# Patient Record
Sex: FEMALE | Race: WHITE | NOT HISPANIC OR LATINO | Employment: FULL TIME | URBAN - METROPOLITAN AREA
[De-identification: names, ages, dates, MRNs, and addresses within clinical notes are randomized per-mention and may not be internally consistent; named-entity substitution may affect disease eponyms.]

---

## 2021-04-09 ENCOUNTER — OFFICE VISIT (OUTPATIENT)
Dept: DERMATOLOGY | Facility: CLINIC | Age: 31
End: 2021-04-09
Payer: COMMERCIAL

## 2021-04-09 ENCOUNTER — TELEPHONE (OUTPATIENT)
Dept: DERMATOLOGY | Facility: CLINIC | Age: 31
End: 2021-04-09

## 2021-04-09 DIAGNOSIS — Z80.8 FAMILY HX OF MELANOMA: ICD-10-CM

## 2021-04-09 DIAGNOSIS — D22.9 MULTIPLE BENIGN NEVI: ICD-10-CM

## 2021-04-09 DIAGNOSIS — B07.8 OTHER VIRAL WARTS: Primary | ICD-10-CM

## 2021-04-09 DIAGNOSIS — D18.01 CHERRY ANGIOMA: ICD-10-CM

## 2021-04-09 DIAGNOSIS — Z12.83 SCREENING EXAM FOR SKIN CANCER: ICD-10-CM

## 2021-04-09 PROCEDURE — 99999 PR PBB SHADOW E&M-NEW PATIENT-LVL II: ICD-10-PCS | Mod: PBBFAC,,, | Performed by: DERMATOLOGY

## 2021-04-09 PROCEDURE — 99203 OFFICE O/P NEW LOW 30 MIN: CPT | Mod: 25,S$GLB,, | Performed by: DERMATOLOGY

## 2021-04-09 PROCEDURE — 1126F AMNT PAIN NOTED NONE PRSNT: CPT | Mod: S$GLB,,, | Performed by: DERMATOLOGY

## 2021-04-09 PROCEDURE — 17110 DESTRUCTION B9 LES UP TO 14: CPT | Mod: S$GLB,,, | Performed by: DERMATOLOGY

## 2021-04-09 PROCEDURE — 17110 PR DESTRUCTION BENIGN LESIONS UP TO 14: ICD-10-PCS | Mod: S$GLB,,, | Performed by: DERMATOLOGY

## 2021-04-09 PROCEDURE — 1126F PR PAIN SEVERITY QUANTIFIED, NO PAIN PRESENT: ICD-10-PCS | Mod: S$GLB,,, | Performed by: DERMATOLOGY

## 2021-04-09 PROCEDURE — 99999 PR PBB SHADOW E&M-NEW PATIENT-LVL II: CPT | Mod: PBBFAC,,, | Performed by: DERMATOLOGY

## 2021-04-09 PROCEDURE — 99203 PR OFFICE/OUTPT VISIT, NEW, LEVL III, 30-44 MIN: ICD-10-PCS | Mod: 25,S$GLB,, | Performed by: DERMATOLOGY

## 2021-05-14 ENCOUNTER — PATIENT OUTREACH (OUTPATIENT)
Dept: ADMINISTRATIVE | Facility: HOSPITAL | Age: 31
End: 2021-05-14

## 2021-05-25 ENCOUNTER — OFFICE VISIT (OUTPATIENT)
Dept: OBSTETRICS AND GYNECOLOGY | Facility: CLINIC | Age: 31
End: 2021-05-25
Attending: OBSTETRICS & GYNECOLOGY
Payer: COMMERCIAL

## 2021-05-25 ENCOUNTER — PATIENT MESSAGE (OUTPATIENT)
Dept: OBSTETRICS AND GYNECOLOGY | Facility: CLINIC | Age: 31
End: 2021-05-25

## 2021-05-25 VITALS
SYSTOLIC BLOOD PRESSURE: 112 MMHG | DIASTOLIC BLOOD PRESSURE: 72 MMHG | BODY MASS INDEX: 27.44 KG/M2 | HEIGHT: 62 IN | WEIGHT: 149.13 LBS

## 2021-05-25 DIAGNOSIS — N64.3 GALACTORRHEA: ICD-10-CM

## 2021-05-25 DIAGNOSIS — Z30.09 ENCOUNTER FOR COUNSELING REGARDING INITIATION OF OTHER CONTRACEPTIVE MEASURE: ICD-10-CM

## 2021-05-25 DIAGNOSIS — Z12.4 PAP SMEAR FOR CERVICAL CANCER SCREENING: Primary | ICD-10-CM

## 2021-05-25 PROCEDURE — 3008F BODY MASS INDEX DOCD: CPT | Mod: CPTII,S$GLB,, | Performed by: OBSTETRICS & GYNECOLOGY

## 2021-05-25 PROCEDURE — 1126F AMNT PAIN NOTED NONE PRSNT: CPT | Mod: S$GLB,,, | Performed by: OBSTETRICS & GYNECOLOGY

## 2021-05-25 PROCEDURE — 3008F PR BODY MASS INDEX (BMI) DOCUMENTED: ICD-10-PCS | Mod: CPTII,S$GLB,, | Performed by: OBSTETRICS & GYNECOLOGY

## 2021-05-25 PROCEDURE — 99999 PR PBB SHADOW E&M-EST. PATIENT-LVL III: CPT | Mod: PBBFAC,,, | Performed by: OBSTETRICS & GYNECOLOGY

## 2021-05-25 PROCEDURE — 99385 PREV VISIT NEW AGE 18-39: CPT | Mod: S$GLB,,, | Performed by: OBSTETRICS & GYNECOLOGY

## 2021-05-25 PROCEDURE — 1126F PR PAIN SEVERITY QUANTIFIED, NO PAIN PRESENT: ICD-10-PCS | Mod: S$GLB,,, | Performed by: OBSTETRICS & GYNECOLOGY

## 2021-05-25 PROCEDURE — 88175 CYTOPATH C/V AUTO FLUID REDO: CPT | Performed by: OBSTETRICS & GYNECOLOGY

## 2021-05-25 PROCEDURE — 99999 PR PBB SHADOW E&M-EST. PATIENT-LVL III: ICD-10-PCS | Mod: PBBFAC,,, | Performed by: OBSTETRICS & GYNECOLOGY

## 2021-05-25 PROCEDURE — 99385 PR PREVENTIVE VISIT,NEW,18-39: ICD-10-PCS | Mod: S$GLB,,, | Performed by: OBSTETRICS & GYNECOLOGY

## 2021-05-25 RX ORDER — DIAZEPAM 2 MG/1
2 TABLET ORAL
Qty: 1 TABLET | Refills: 0 | Status: SHIPPED | OUTPATIENT
Start: 2021-05-25 | End: 2023-02-14

## 2021-05-28 ENCOUNTER — LAB VISIT (OUTPATIENT)
Dept: LAB | Facility: OTHER | Age: 31
End: 2021-05-28
Attending: STUDENT IN AN ORGANIZED HEALTH CARE EDUCATION/TRAINING PROGRAM
Payer: COMMERCIAL

## 2021-05-28 ENCOUNTER — OFFICE VISIT (OUTPATIENT)
Dept: INTERNAL MEDICINE | Facility: CLINIC | Age: 31
End: 2021-05-28
Payer: COMMERCIAL

## 2021-05-28 VITALS
HEART RATE: 90 BPM | OXYGEN SATURATION: 99 % | HEIGHT: 62 IN | DIASTOLIC BLOOD PRESSURE: 67 MMHG | SYSTOLIC BLOOD PRESSURE: 119 MMHG | WEIGHT: 150.38 LBS | BODY MASS INDEX: 27.67 KG/M2

## 2021-05-28 DIAGNOSIS — Z11.4 SCREENING FOR HIV (HUMAN IMMUNODEFICIENCY VIRUS): ICD-10-CM

## 2021-05-28 DIAGNOSIS — N64.3 GALACTORRHEA: ICD-10-CM

## 2021-05-28 DIAGNOSIS — Z11.59 NEED FOR HEPATITIS C SCREENING TEST: ICD-10-CM

## 2021-05-28 DIAGNOSIS — Z00.00 PREVENTATIVE HEALTH CARE: ICD-10-CM

## 2021-05-28 DIAGNOSIS — Z13.220 ENCOUNTER FOR LIPID SCREENING FOR CARDIOVASCULAR DISEASE: ICD-10-CM

## 2021-05-28 DIAGNOSIS — Z00.00 PREVENTATIVE HEALTH CARE: Primary | ICD-10-CM

## 2021-05-28 DIAGNOSIS — Z13.6 ENCOUNTER FOR LIPID SCREENING FOR CARDIOVASCULAR DISEASE: ICD-10-CM

## 2021-05-28 DIAGNOSIS — M94.0 COSTOCHONDRITIS: ICD-10-CM

## 2021-05-28 LAB
ALBUMIN SERPL BCP-MCNC: 4.4 G/DL (ref 3.5–5.2)
ALP SERPL-CCNC: 66 U/L (ref 55–135)
ALT SERPL W/O P-5'-P-CCNC: 17 U/L (ref 10–44)
ANION GAP SERPL CALC-SCNC: 10 MMOL/L (ref 8–16)
AST SERPL-CCNC: 16 U/L (ref 10–40)
BILIRUB SERPL-MCNC: 1 MG/DL (ref 0.1–1)
BUN SERPL-MCNC: 6 MG/DL (ref 6–20)
CALCIUM SERPL-MCNC: 9.9 MG/DL (ref 8.7–10.5)
CHLORIDE SERPL-SCNC: 108 MMOL/L (ref 95–110)
CHOLEST SERPL-MCNC: 166 MG/DL (ref 120–199)
CHOLEST/HDLC SERPL: 2.2 {RATIO} (ref 2–5)
CO2 SERPL-SCNC: 22 MMOL/L (ref 23–29)
CREAT SERPL-MCNC: 0.8 MG/DL (ref 0.5–1.4)
EST. GFR  (AFRICAN AMERICAN): >60 ML/MIN/1.73 M^2
EST. GFR  (NON AFRICAN AMERICAN): >60 ML/MIN/1.73 M^2
GLUCOSE SERPL-MCNC: 80 MG/DL (ref 70–110)
HDLC SERPL-MCNC: 75 MG/DL (ref 40–75)
HDLC SERPL: 45.2 % (ref 20–50)
LDLC SERPL CALC-MCNC: 82 MG/DL (ref 63–159)
NONHDLC SERPL-MCNC: 91 MG/DL
POTASSIUM SERPL-SCNC: 4.3 MMOL/L (ref 3.5–5.1)
PROLACTIN SERPL IA-MCNC: 8.3 NG/ML (ref 5.2–26.5)
PROT SERPL-MCNC: 7.5 G/DL (ref 6–8.4)
SODIUM SERPL-SCNC: 140 MMOL/L (ref 136–145)
TRIGL SERPL-MCNC: 45 MG/DL (ref 30–150)

## 2021-05-28 PROCEDURE — 86803 HEPATITIS C AB TEST: CPT | Performed by: STUDENT IN AN ORGANIZED HEALTH CARE EDUCATION/TRAINING PROGRAM

## 2021-05-28 PROCEDURE — 3008F PR BODY MASS INDEX (BMI) DOCUMENTED: ICD-10-PCS | Mod: CPTII,S$GLB,, | Performed by: STUDENT IN AN ORGANIZED HEALTH CARE EDUCATION/TRAINING PROGRAM

## 2021-05-28 PROCEDURE — 99385 PR PREVENTIVE VISIT,NEW,18-39: ICD-10-PCS | Mod: S$GLB,,, | Performed by: STUDENT IN AN ORGANIZED HEALTH CARE EDUCATION/TRAINING PROGRAM

## 2021-05-28 PROCEDURE — 99385 PREV VISIT NEW AGE 18-39: CPT | Mod: S$GLB,,, | Performed by: STUDENT IN AN ORGANIZED HEALTH CARE EDUCATION/TRAINING PROGRAM

## 2021-05-28 PROCEDURE — 3008F BODY MASS INDEX DOCD: CPT | Mod: CPTII,S$GLB,, | Performed by: STUDENT IN AN ORGANIZED HEALTH CARE EDUCATION/TRAINING PROGRAM

## 2021-05-28 PROCEDURE — 36415 COLL VENOUS BLD VENIPUNCTURE: CPT | Performed by: STUDENT IN AN ORGANIZED HEALTH CARE EDUCATION/TRAINING PROGRAM

## 2021-05-28 PROCEDURE — 99999 PR PBB SHADOW E&M-EST. PATIENT-LVL III: ICD-10-PCS | Mod: PBBFAC,,, | Performed by: STUDENT IN AN ORGANIZED HEALTH CARE EDUCATION/TRAINING PROGRAM

## 2021-05-28 PROCEDURE — 99999 PR PBB SHADOW E&M-EST. PATIENT-LVL III: CPT | Mod: PBBFAC,,, | Performed by: STUDENT IN AN ORGANIZED HEALTH CARE EDUCATION/TRAINING PROGRAM

## 2021-05-28 PROCEDURE — 1126F PR PAIN SEVERITY QUANTIFIED, NO PAIN PRESENT: ICD-10-PCS | Mod: S$GLB,,, | Performed by: STUDENT IN AN ORGANIZED HEALTH CARE EDUCATION/TRAINING PROGRAM

## 2021-05-28 PROCEDURE — 80053 COMPREHEN METABOLIC PANEL: CPT | Performed by: STUDENT IN AN ORGANIZED HEALTH CARE EDUCATION/TRAINING PROGRAM

## 2021-05-28 PROCEDURE — 80061 LIPID PANEL: CPT | Performed by: STUDENT IN AN ORGANIZED HEALTH CARE EDUCATION/TRAINING PROGRAM

## 2021-05-28 PROCEDURE — 1126F AMNT PAIN NOTED NONE PRSNT: CPT | Mod: S$GLB,,, | Performed by: STUDENT IN AN ORGANIZED HEALTH CARE EDUCATION/TRAINING PROGRAM

## 2021-05-28 PROCEDURE — 84146 ASSAY OF PROLACTIN: CPT | Performed by: STUDENT IN AN ORGANIZED HEALTH CARE EDUCATION/TRAINING PROGRAM

## 2021-05-28 PROCEDURE — 86703 HIV-1/HIV-2 1 RESULT ANTBDY: CPT | Performed by: STUDENT IN AN ORGANIZED HEALTH CARE EDUCATION/TRAINING PROGRAM

## 2021-05-31 LAB
FINAL PATHOLOGIC DIAGNOSIS: NORMAL
HCV AB SERPL QL IA: NEGATIVE
HIV 1+2 AB+HIV1 P24 AG SERPL QL IA: NEGATIVE
Lab: NORMAL

## 2021-06-03 ENCOUNTER — PATIENT MESSAGE (OUTPATIENT)
Dept: OBSTETRICS AND GYNECOLOGY | Facility: CLINIC | Age: 31
End: 2021-06-03

## 2021-07-06 ENCOUNTER — TELEPHONE (OUTPATIENT)
Dept: OBSTETRICS AND GYNECOLOGY | Facility: CLINIC | Age: 31
End: 2021-07-06

## 2021-08-09 ENCOUNTER — PATIENT MESSAGE (OUTPATIENT)
Dept: OBSTETRICS AND GYNECOLOGY | Facility: CLINIC | Age: 31
End: 2021-08-09

## 2021-08-13 ENCOUNTER — PROCEDURE VISIT (OUTPATIENT)
Dept: OBSTETRICS AND GYNECOLOGY | Facility: CLINIC | Age: 31
End: 2021-08-13
Payer: COMMERCIAL

## 2021-08-13 VITALS
SYSTOLIC BLOOD PRESSURE: 126 MMHG | HEIGHT: 62 IN | DIASTOLIC BLOOD PRESSURE: 84 MMHG | BODY MASS INDEX: 27.99 KG/M2 | WEIGHT: 152.13 LBS

## 2021-08-13 DIAGNOSIS — Z30.433 ENCOUNTER FOR IUD REMOVAL AND REINSERTION: Primary | ICD-10-CM

## 2021-08-13 PROCEDURE — 58300 INSERT INTRAUTERINE DEVICE: CPT | Mod: S$GLB,,, | Performed by: OBSTETRICS & GYNECOLOGY

## 2021-08-13 PROCEDURE — 58300 INSERTION OF IUD: ICD-10-PCS | Mod: S$GLB,,, | Performed by: OBSTETRICS & GYNECOLOGY

## 2021-08-13 PROCEDURE — 58301 REMOVE INTRAUTERINE DEVICE: CPT | Mod: S$GLB,,, | Performed by: OBSTETRICS & GYNECOLOGY

## 2021-08-13 PROCEDURE — 58301 PR REMOVE, INTRAUTERINE DEVICE: ICD-10-PCS | Mod: S$GLB,,, | Performed by: OBSTETRICS & GYNECOLOGY

## 2021-10-26 ENCOUNTER — OFFICE VISIT (OUTPATIENT)
Dept: OBSTETRICS AND GYNECOLOGY | Facility: CLINIC | Age: 31
End: 2021-10-26
Payer: COMMERCIAL

## 2021-10-26 VITALS
HEIGHT: 62 IN | DIASTOLIC BLOOD PRESSURE: 70 MMHG | WEIGHT: 153.88 LBS | SYSTOLIC BLOOD PRESSURE: 124 MMHG | BODY MASS INDEX: 28.32 KG/M2

## 2021-10-26 DIAGNOSIS — Z30.431 IUD CHECK UP: Primary | ICD-10-CM

## 2021-10-26 PROCEDURE — 3078F DIAST BP <80 MM HG: CPT | Mod: CPTII,S$GLB,, | Performed by: OBSTETRICS & GYNECOLOGY

## 2021-10-26 PROCEDURE — 3074F SYST BP LT 130 MM HG: CPT | Mod: CPTII,S$GLB,, | Performed by: OBSTETRICS & GYNECOLOGY

## 2021-10-26 PROCEDURE — 1160F PR REVIEW ALL MEDS BY PRESCRIBER/CLIN PHARMACIST DOCUMENTED: ICD-10-PCS | Mod: CPTII,S$GLB,, | Performed by: OBSTETRICS & GYNECOLOGY

## 2021-10-26 PROCEDURE — 99213 PR OFFICE/OUTPT VISIT, EST, LEVL III, 20-29 MIN: ICD-10-PCS | Mod: S$GLB,,, | Performed by: OBSTETRICS & GYNECOLOGY

## 2021-10-26 PROCEDURE — 1159F MED LIST DOCD IN RCRD: CPT | Mod: CPTII,S$GLB,, | Performed by: OBSTETRICS & GYNECOLOGY

## 2021-10-26 PROCEDURE — 99999 PR PBB SHADOW E&M-EST. PATIENT-LVL III: CPT | Mod: PBBFAC,,, | Performed by: OBSTETRICS & GYNECOLOGY

## 2021-10-26 PROCEDURE — 99999 PR PBB SHADOW E&M-EST. PATIENT-LVL III: ICD-10-PCS | Mod: PBBFAC,,, | Performed by: OBSTETRICS & GYNECOLOGY

## 2021-10-26 PROCEDURE — 1159F PR MEDICATION LIST DOCUMENTED IN MEDICAL RECORD: ICD-10-PCS | Mod: CPTII,S$GLB,, | Performed by: OBSTETRICS & GYNECOLOGY

## 2021-10-26 PROCEDURE — 1160F RVW MEDS BY RX/DR IN RCRD: CPT | Mod: CPTII,S$GLB,, | Performed by: OBSTETRICS & GYNECOLOGY

## 2021-10-26 PROCEDURE — 3074F PR MOST RECENT SYSTOLIC BLOOD PRESSURE < 130 MM HG: ICD-10-PCS | Mod: CPTII,S$GLB,, | Performed by: OBSTETRICS & GYNECOLOGY

## 2021-10-26 PROCEDURE — 3008F BODY MASS INDEX DOCD: CPT | Mod: CPTII,S$GLB,, | Performed by: OBSTETRICS & GYNECOLOGY

## 2021-10-26 PROCEDURE — 3008F PR BODY MASS INDEX (BMI) DOCUMENTED: ICD-10-PCS | Mod: CPTII,S$GLB,, | Performed by: OBSTETRICS & GYNECOLOGY

## 2021-10-26 PROCEDURE — 99213 OFFICE O/P EST LOW 20 MIN: CPT | Mod: S$GLB,,, | Performed by: OBSTETRICS & GYNECOLOGY

## 2021-10-26 PROCEDURE — 3078F PR MOST RECENT DIASTOLIC BLOOD PRESSURE < 80 MM HG: ICD-10-PCS | Mod: CPTII,S$GLB,, | Performed by: OBSTETRICS & GYNECOLOGY

## 2021-11-16 ENCOUNTER — IMMUNIZATION (OUTPATIENT)
Dept: PRIMARY CARE CLINIC | Facility: CLINIC | Age: 31
End: 2021-11-16
Payer: COMMERCIAL

## 2021-11-16 DIAGNOSIS — Z23 NEED FOR VACCINATION: Primary | ICD-10-CM

## 2021-11-16 PROCEDURE — 0004A COVID-19, MRNA, LNP-S, PF, 30 MCG/0.3 ML DOSE VACCINE: CPT | Mod: CV19,PBBFAC | Performed by: INTERNAL MEDICINE

## 2022-01-31 ENCOUNTER — HOSPITAL ENCOUNTER (EMERGENCY)
Facility: OTHER | Age: 32
Discharge: HOME | End: 2022-02-01
Attending: EMERGENCY MEDICINE
Payer: COMMERCIAL

## 2022-01-31 DIAGNOSIS — R05.9 COUGH: ICD-10-CM

## 2022-01-31 DIAGNOSIS — R53.83 PERSISTENT FATIGUE AFTER COVID-19: ICD-10-CM

## 2022-01-31 DIAGNOSIS — H53.8 BLURRY VISION, BILATERAL: Primary | ICD-10-CM

## 2022-01-31 DIAGNOSIS — R06.00 DYSPNEA: ICD-10-CM

## 2022-01-31 DIAGNOSIS — U09.9 PERSISTENT FATIGUE AFTER COVID-19: ICD-10-CM

## 2022-01-31 PROCEDURE — 99285 EMERGENCY DEPT VISIT HI MDM: CPT | Mod: 25

## 2022-02-01 VITALS
OXYGEN SATURATION: 95 % | WEIGHT: 150 LBS | BODY MASS INDEX: 27.44 KG/M2 | SYSTOLIC BLOOD PRESSURE: 129 MMHG | TEMPERATURE: 98 F | RESPIRATION RATE: 19 BRPM | DIASTOLIC BLOOD PRESSURE: 76 MMHG | HEART RATE: 76 BPM

## 2022-02-01 LAB
ALBUMIN SERPL BCP-MCNC: 4.4 G/DL (ref 3.5–5.2)
ALP SERPL-CCNC: 87 U/L (ref 55–135)
ALT SERPL W/O P-5'-P-CCNC: 12 U/L (ref 10–44)
ANION GAP SERPL CALC-SCNC: 13 MMOL/L (ref 8–16)
AST SERPL-CCNC: 13 U/L (ref 10–40)
BASOPHILS # BLD AUTO: 0.06 K/UL (ref 0–0.2)
BASOPHILS NFR BLD: 0.4 % (ref 0–1.9)
BILIRUB SERPL-MCNC: 0.4 MG/DL (ref 0.1–1)
BUN SERPL-MCNC: 12 MG/DL (ref 6–20)
CALCIUM SERPL-MCNC: 10 MG/DL (ref 8.7–10.5)
CHLORIDE SERPL-SCNC: 107 MMOL/L (ref 95–110)
CK SERPL-CCNC: 56 U/L (ref 20–180)
CO2 SERPL-SCNC: 22 MMOL/L (ref 23–29)
CREAT SERPL-MCNC: 0.8 MG/DL (ref 0.5–1.4)
CRP SERPL-MCNC: 1.8 MG/L (ref 0–8.2)
D DIMER PPP IA.FEU-MCNC: 0.26 MG/L FEU
DIFFERENTIAL METHOD: ABNORMAL
EOSINOPHIL # BLD AUTO: 0.1 K/UL (ref 0–0.5)
EOSINOPHIL NFR BLD: 0.9 % (ref 0–8)
ERYTHROCYTE [DISTWIDTH] IN BLOOD BY AUTOMATED COUNT: 11.9 % (ref 11.5–14.5)
EST. GFR  (AFRICAN AMERICAN): >60 ML/MIN/1.73 M^2
EST. GFR  (NON AFRICAN AMERICAN): >60 ML/MIN/1.73 M^2
GLUCOSE SERPL-MCNC: 89 MG/DL (ref 70–110)
HCT VFR BLD AUTO: 42.8 % (ref 37–48.5)
HGB BLD-MCNC: 14.5 G/DL (ref 12–16)
IMM GRANULOCYTES # BLD AUTO: 0.04 K/UL (ref 0–0.04)
IMM GRANULOCYTES NFR BLD AUTO: 0.3 % (ref 0–0.5)
LYMPHOCYTES # BLD AUTO: 1.6 K/UL (ref 1–4.8)
LYMPHOCYTES NFR BLD: 11.9 % (ref 18–48)
MAGNESIUM SERPL-MCNC: 2.2 MG/DL (ref 1.6–2.6)
MCH RBC QN AUTO: 29.9 PG (ref 27–31)
MCHC RBC AUTO-ENTMCNC: 33.9 G/DL (ref 32–36)
MCV RBC AUTO: 88 FL (ref 82–98)
MONOCYTES # BLD AUTO: 0.8 K/UL (ref 0.3–1)
MONOCYTES NFR BLD: 6.2 % (ref 4–15)
NEUTROPHILS # BLD AUTO: 10.9 K/UL (ref 1.8–7.7)
NEUTROPHILS NFR BLD: 80.3 % (ref 38–73)
NRBC BLD-RTO: 0 /100 WBC
PLATELET # BLD AUTO: 341 K/UL (ref 150–450)
PMV BLD AUTO: 10.2 FL (ref 9.2–12.9)
POTASSIUM SERPL-SCNC: 3.7 MMOL/L (ref 3.5–5.1)
PROT SERPL-MCNC: 8 G/DL (ref 6–8.4)
RBC # BLD AUTO: 4.85 M/UL (ref 4–5.4)
SODIUM SERPL-SCNC: 142 MMOL/L (ref 136–145)
WBC # BLD AUTO: 13.58 K/UL (ref 3.9–12.7)

## 2022-02-01 PROCEDURE — 86140 C-REACTIVE PROTEIN: CPT | Performed by: EMERGENCY MEDICINE

## 2022-02-01 PROCEDURE — 85025 COMPLETE CBC W/AUTO DIFF WBC: CPT | Performed by: EMERGENCY MEDICINE

## 2022-02-01 PROCEDURE — 85379 FIBRIN DEGRADATION QUANT: CPT | Performed by: EMERGENCY MEDICINE

## 2022-02-01 PROCEDURE — 80053 COMPREHEN METABOLIC PANEL: CPT | Performed by: EMERGENCY MEDICINE

## 2022-02-01 PROCEDURE — 83735 ASSAY OF MAGNESIUM: CPT | Performed by: EMERGENCY MEDICINE

## 2022-02-01 PROCEDURE — 82550 ASSAY OF CK (CPK): CPT | Performed by: EMERGENCY MEDICINE

## 2022-02-01 NOTE — ED PROVIDER NOTES
"Encounter Date: 2022    SCRIBE #1 NOTE: I, Avis Yesenia, am scribing for, and in the presence of, Tammy Martines MD.       History     Chief Complaint   Patient presents with    Visual Field Change     Pt reports reports sudden change in vision with onset 9pm, reports also feeling cold at the time, reports improvement in symptoms, pins and needle feeling in bilateral hands     Bladimir Bernal is a 31 y.o. female who presents to the ED with blurred vision and multiple other complaints. Patient reports she tested positive for COVID-19 over 3 weeks ago on 2022 after an onset of symptoms on 2022; states she had a cough, "lung pain", shortness of breath, fatigue, myalgias, weakness. She reports still experiencing persistent shortness of breath, fatigue, and myalgias, but tonight, she experienced a sudden onset of an episode of blurry vision, "pins and needles and feeling cold" to her bilateral hands, and lightheadedness, that resolved after 1 hour, which prompted her to the ED. States she is currently back to her baseline since her COVID-19 infection. No other exacerbating or alleviating factors. Denies cigarette use. Denies speech difficulty, headache, gait abnormalities or trouble ambulating, or other associated symptoms. Notes FHx of stroke (aunt at age 40's-50's).     The history is provided by the patient.     Review of patient's allergies indicates:  No Known Allergies  Past Medical History:   Diagnosis Date    IUD contraception     Mirena     Past Surgical History:   Procedure Laterality Date    WISDOM TOOTH EXTRACTION       Family History   Problem Relation Age of Onset    Heart attack Paternal Grandfather     Hypertension Father     Skin cancer Father         melanoma    Hyperlipidemia Mother     Colon cancer Maternal Aunt     Stroke Maternal Aunt     Suicide Maternal Aunt     Breast cancer Neg Hx     Eclampsia Neg Hx      labor Neg Hx     Miscarriages / Stillbirths Neg Hx     " "Ovarian cancer Neg Hx      Social History     Tobacco Use    Smoking status: Never Smoker    Smokeless tobacco: Never Used   Substance Use Topics    Alcohol use: Yes     Alcohol/week: 4.0 - 5.0 standard drinks     Types: 4 - 5 Standard drinks or equivalent per week     Comment: 1-2 a week , social    Drug use: Yes     Types: Marijuana     Review of Systems   Constitutional: Positive for fatigue. Negative for chills and fever.   HENT: Negative for congestion, rhinorrhea and sore throat.    Eyes: Positive for visual disturbance (bilateral blurred vision).   Respiratory: Positive for shortness of breath. Negative for cough.    Cardiovascular: Negative for chest pain.   Gastrointestinal: Negative for abdominal pain, diarrhea, nausea and vomiting.   Genitourinary: Negative for dysuria, frequency and hematuria.   Musculoskeletal: Positive for myalgias. Negative for back pain.   Skin: Negative for rash.        + "feeling cold" to hands.   Neurological: Positive for light-headedness. Negative for dizziness, speech difficulty, weakness, numbness and headaches.        + "pins and needles" to bilateral hands.   Psychiatric/Behavioral: Negative for confusion.       Physical Exam     Initial Vitals [01/31/22 2247]   BP Pulse Resp Temp SpO2   134/78 87 18 97.9 °F (36.6 °C) 95 %      MAP       --         Physical Exam    Nursing note and vitals reviewed.  Constitutional: She appears well-developed and well-nourished. She does not have a sickly appearance. No distress.   HENT:   Head: Normocephalic and atraumatic.   Right Ear: External ear normal.   Left Ear: External ear normal.   Eyes: Conjunctivae, EOM and lids are normal. Right eye exhibits no discharge. Left eye exhibits no discharge. Right conjunctiva is not injected. Right conjunctiva has no hemorrhage. Left conjunctiva is not injected. Left conjunctiva has no hemorrhage.   Neck: Phonation normal. No stridor present.   Normal range of motion.  Cardiovascular: Normal " rate, regular rhythm and normal heart sounds. Exam reveals no friction rub.    No murmur heard.  Pulmonary/Chest: Breath sounds normal. No respiratory distress. She has no wheezes. She has no rhonchi. She has no rales.   Abdominal: Abdomen is soft. There is no abdominal tenderness.   Musculoskeletal:      Cervical back: Normal range of motion.     Neurological: She is alert and oriented to person, place, and time. She has normal strength. GCS eye subscore is 4. GCS verbal subscore is 5. GCS motor subscore is 6.   Cranial nerves, strength, sensation, speech, H2N, and F2N normal and intact.    Skin: Skin is warm.   Psychiatric: She has a normal mood and affect. Her speech is normal and behavior is normal. Judgment and thought content normal. Cognition and memory are normal.         ED Course   Procedures  Labs Reviewed   CBC W/ AUTO DIFFERENTIAL - Abnormal; Notable for the following components:       Result Value    WBC 13.58 (*)     Gran # (ANC) 10.9 (*)     Gran % 80.3 (*)     Lymph % 11.9 (*)     All other components within normal limits   COMPREHENSIVE METABOLIC PANEL - Abnormal; Notable for the following components:    CO2 22 (*)     All other components within normal limits   CK   C-REACTIVE PROTEIN   D DIMER, QUANTITATIVE   MAGNESIUM     EKG Readings: (Independently Interpreted)   Initial Reading: No STEMI.   Normal sinus rhythm with a rate of 71 bpm. No ST or T wave changes.        Imaging Results          X-Ray Chest AP Portable (Final result)  Result time 02/01/22 02:58:58    Final result by Jose Gutiérrez MD (02/01/22 02:58:58)                 Impression:      No radiographic evidence of acute intrathoracic process on this single view.      Electronically signed by: Jose Gutiérrez MD  Date:    02/01/2022  Time:    02:58             Narrative:    EXAMINATION:  XR CHEST AP PORTABLE    CLINICAL HISTORY:  Cough, unspecified    TECHNIQUE:  Single frontal view of the chest was  performed.    COMPARISON:  None    FINDINGS:  Cardiac monitoring leads overlie the chest.  Cardiac silhouette appears within normal limits.  Lungs are symmetrically expanded without evidence of confluent airspace consolidation.  No significant volume of pleural fluid or pneumothorax identified.  Osseous structures appear intact.                               CT Head Without Contrast (Final result)  Result time 02/01/22 01:19:23    Final result by Terry Laurent MD (02/01/22 01:19:23)                 Impression:      No acute intracranial abnormalities identified.      Electronically signed by: Terry Laurent MD  Date:    02/01/2022  Time:    01:19             Narrative:    EXAMINATION:  CT HEAD WITHOUT CONTRAST    CLINICAL HISTORY:  Neuro deficit, acute, stroke suspected;    TECHNIQUE:  Low dose axial images were obtained through the head.  Coronal and sagittal reformations were also performed. Contrast was not administered.    COMPARISON:  None.    FINDINGS:  The brain is normally formed and exhibits normal density throughout with no indication of acute/recent major vascular distribution cerebral infarction, intraparenchymal hemorrhage, or intra-axial space occupying lesion. The ventricular system is normal in size and configuration with no evidence of hydrocephalus. No effacement of the skull-base cisterns. No abnormal extra-axial fluid collections or blood products. Visualized paranasal sinuses and mastoid air cells are clear. The calvarium shows no significant abnormality.                                 Medications - No data to display  Medical Decision Making:   History:   Old Medical Records: I decided to obtain old medical records.  Independently Interpreted Test(s):   I have ordered and independently interpreted EKG Reading(s) - see prior notes  Clinical Tests:   Lab Tests: Ordered and Reviewed  Radiological Study: Ordered and Reviewed  Medical Tests: Ordered and Reviewed    Additional MDM:   Comments:  31-year-old healthy female presented for evaluation of sudden onset of bilateral blurry vision and worsening fatigue and dyspnea which has been present throughout the month since she was diagnosed with COVID.  Patient was well-appearing on exam with normal triage vital signs.  Physical exam was unremarkable.  Specifically her neuro exam was nonfocal.  CT of the head and lab work showed no significant abnormalities.  EKG showed no evidence of dysrhythmia or ischemic changes.  Chest x-ray was also negative for any acute process.  Patient was reassured and instructed to follow-up with both Ophthalmology and primary care for further evaluation.  Indications for seeking immediate re-evaluation the emergency department were also discussed.  Patient was discharged home in stable condition..        Scribe Attestation:   Scribe #1: I performed the above scribed service and the documentation accurately describes the services I performed. I attest to the accuracy of the note.               I, Tammy Martines  , personally performed the services described in this documentation. All medical record entries made by the scribe were at my direction and in my presence. I have reviewed the chart and agree that the record reflects my personal performance and is accurate and complete.    Clinical Impression:   Final diagnoses:  [R06.00] Dyspnea  [R05.9] Cough  [H53.8] Blurry vision, bilateral (Primary)  [R53.83, B94.8] Persistent fatigue after COVID-19          ED Disposition Condition    Discharge Stable        ED Prescriptions     None        Follow-up Information     Follow up With Specialties Details Why Contact Info Additional Information    Guanako Ascencio MD Family Medicine Schedule an appointment as soon as possible for a visit   [address]  [address]  [facility] [address]  [phone]       [facility] - Ophthalmology Ophthalmology Schedule an appointment as soon as possible for a visit   [address]  [place] [address]  [phone] Ophthalmology - [facility], 3rd Floor, [address] Please park in [place] [address] and use [place] elevators    [facility] - Emergency Dept Emergency Medicine Go to  If symptoms worsen [address]  [facility] [address]  [phone]            Tammy Martines MD  02/01/22 0501

## 2022-02-01 NOTE — ED NOTES
Pt presents to ED with c/o vision changes today, new onset. Pt sts she is post COVID  approx 1 mth. Pt sts he has been having colds sensations in bilateral upper and lower extremities. Pt sts the cold sensation was more on the rt side along with some tingling to her fingertips on the rt side. Pt sts post COVID body aches, fatigue and generalized weakness. Pt also sts she becomes winded when she is talking on the phone. Pt also sts she has been experiencing some chest discomfort when she breathes deeply 2/10 . Pt sts a sensation blood rushing to her head but denies pain or pressure in the head. Pt sts she has has intermittentent HA, nausea. Pt denies vomiting.  Pt sts new onset of tingling in her lips.

## 2022-02-02 ENCOUNTER — PES CALL (OUTPATIENT)
Dept: ADMINISTRATIVE | Facility: CLINIC | Age: 32
End: 2022-02-02
Payer: COMMERCIAL

## 2022-04-04 NOTE — PROGRESS NOTES
[facility] Consultation Note    Reason for Consult:  The primary encounter diagnosis was Abdominal pain, epigastric. A diagnosis of Non-intractable vomiting with nausea, unspecified vomiting type was also pertinent to this visit.    PCP:   Guanako Ascencio   [address] / [facility] [address]    Referring MD:  Guanako Ascencio Md  [address]  [address]  [place] [address]    Initial History of Present Illness (HPI):  This is a 31 y.o. female here for evaluation of rectal bleeding, which has actually resolved on its own  CBC was normal  Sometimes throws up in the AM, bilious. Associated with work    Abdominal pain - no  Reflux - no  Dysphagia - no   Bowel habits - normal  GI bleeding - none  NSAID usage - none        ROS:  Constitutional: No fevers, chills, No weight loss  ENT: No allergies  CV: No chest pain  Pulm: No cough, No shortness of breath  Ophtho: No vision changes  GI: see HPI  Derm: + rash  Heme: No lymphadenopathy, No bruising  MSK: No arthritis  : No dysuria, No hematuria  Endo: No hot or cold intolerance  Neuro: No syncope, No seizure  Psych: No anxiety, No depression    Medical History:  has a past medical history of IUD contraception.    Surgical History:  has a past surgical history that includes [place] tooth extraction.    Family History: family history includes Colon cancer in her maternal aunt; Heart attack in her paternal grandfather; Hyperlipidemia in her mother; Hypertension in her father; Skin cancer in her father; Stroke in her maternal aunt; Suicide in her maternal aunt..     Social History:  reports that she has never smoked. She has never used smokeless tobacco. She reports current alcohol use of about 4.0 - 5.0 standard drinks of alcohol per week. She reports current drug use. Drug: Marijuana.    Review of patient's allergies indicates:  No Known Allergies    Medication List with Changes/Refills   New Medications    AMITRIPTYLINE  "(ELAVIL) 10 MG TABLET    Take 1 tablet (10 mg total) by mouth every evening.   Current Medications    DIAZEPAM (VALIUM) 2 MG TABLET    Take 1 tablet (2 mg total) by mouth On call Procedure for Anxiety. Take after signing consents for IUD placement    LEVONORGESTREL (MIRENA IU)             Objective Findings:    Vital Signs:  /66   Pulse 80   Ht 5' 2" (1.575 m)   Wt 70.5 kg (155 lb 5 oz)   BMI 28.41 kg/m²   Body mass index is 28.41 kg/m².    Physical Exam:  General Appearance: Well appearing in no acute distress  Head:   Normocephalic, without obvious abnormality  Eyes:    No scleral icterus, EOMI  ENT: Neck supple, Lips, mucosa, and tongue normal; teeth and gums normal  Lungs: CTA bilaterally in anterior and posterior fields, no wheezes, no crackles.  Heart:  Regular rate and rhythm, S1, S2 normal, no murmurs heard  Abdomen: Soft, non tender, non distended with positive bowel sounds in all four quadrants. No hepatosplenomegaly, ascites, or mass  Extremities: 2+ pulses, no clubbing, cyanosis or edema  Skin: No rash  Neurologic: CN II-XII intact      Labs:  Lab Results   Component Value Date    WBC 13.58 (H) 02/01/2022    HGB 14.5 02/01/2022    HCT 42.8 02/01/2022     02/01/2022    CHOL 166 05/28/2021    TRIG 45 05/28/2021    HDL 75 05/28/2021    ALT 12 02/01/2022    AST 13 02/01/2022     02/01/2022    K 3.7 02/01/2022     02/01/2022    CREATININE 0.8 02/01/2022    BUN 12 02/01/2022    CO2 22 (L) 02/01/2022       No results found for: HPYLORINTERP  No results found for: HPYLORIANTIG        Imaging:    Endoscopy:    None      Assessment:  1. Abdominal pain, epigastric    2. Non-intractable vomiting with nausea, unspecified vomiting type       Discussed that the nausea may certainly be anxiety related and we discussed options with therapy versus pharmaceuticals    Recommendations:  1. Check h pylori  2. Elavil Rx - she will consider  3. Hold off on EGD for now    No follow-ups on " file.      Order summary:  Orders Placed This Encounter    H. pylori Antibody, IgG    amitriptyline (ELAVIL) 10 MG tablet         Thank you so much for allowing me to participate in the care of Bladimir Griffiths MD

## 2022-04-06 ENCOUNTER — OFFICE VISIT (OUTPATIENT)
Dept: GASTROENTEROLOGY | Facility: CLINIC | Age: 32
End: 2022-04-06
Payer: COMMERCIAL

## 2022-04-06 ENCOUNTER — LAB VISIT (OUTPATIENT)
Dept: LAB | Facility: HOSPITAL | Age: 32
End: 2022-04-06
Attending: INTERNAL MEDICINE
Payer: COMMERCIAL

## 2022-04-06 VITALS
DIASTOLIC BLOOD PRESSURE: 66 MMHG | HEART RATE: 80 BPM | SYSTOLIC BLOOD PRESSURE: 105 MMHG | WEIGHT: 155.31 LBS | BODY MASS INDEX: 28.58 KG/M2 | HEIGHT: 62 IN

## 2022-04-06 DIAGNOSIS — R10.13 ABDOMINAL PAIN, EPIGASTRIC: ICD-10-CM

## 2022-04-06 DIAGNOSIS — R10.13 ABDOMINAL PAIN, EPIGASTRIC: Primary | ICD-10-CM

## 2022-04-06 DIAGNOSIS — R11.2 NON-INTRACTABLE VOMITING WITH NAUSEA, UNSPECIFIED VOMITING TYPE: ICD-10-CM

## 2022-04-06 PROCEDURE — 3008F PR BODY MASS INDEX (BMI) DOCUMENTED: ICD-10-PCS | Mod: CPTII,S$GLB,, | Performed by: INTERNAL MEDICINE

## 2022-04-06 PROCEDURE — 1159F PR MEDICATION LIST DOCUMENTED IN MEDICAL RECORD: ICD-10-PCS | Mod: CPTII,S$GLB,, | Performed by: INTERNAL MEDICINE

## 2022-04-06 PROCEDURE — 3074F PR MOST RECENT SYSTOLIC BLOOD PRESSURE < 130 MM HG: ICD-10-PCS | Mod: CPTII,S$GLB,, | Performed by: INTERNAL MEDICINE

## 2022-04-06 PROCEDURE — 1159F MED LIST DOCD IN RCRD: CPT | Mod: CPTII,S$GLB,, | Performed by: INTERNAL MEDICINE

## 2022-04-06 PROCEDURE — 86677 HELICOBACTER PYLORI ANTIBODY: CPT | Performed by: INTERNAL MEDICINE

## 2022-04-06 PROCEDURE — 3074F SYST BP LT 130 MM HG: CPT | Mod: CPTII,S$GLB,, | Performed by: INTERNAL MEDICINE

## 2022-04-06 PROCEDURE — 36415 COLL VENOUS BLD VENIPUNCTURE: CPT | Performed by: INTERNAL MEDICINE

## 2022-04-06 PROCEDURE — 3078F DIAST BP <80 MM HG: CPT | Mod: CPTII,S$GLB,, | Performed by: INTERNAL MEDICINE

## 2022-04-06 PROCEDURE — 3008F BODY MASS INDEX DOCD: CPT | Mod: CPTII,S$GLB,, | Performed by: INTERNAL MEDICINE

## 2022-04-06 PROCEDURE — 99999 PR PBB SHADOW E&M-EST. PATIENT-LVL III: ICD-10-PCS | Mod: PBBFAC,,, | Performed by: INTERNAL MEDICINE

## 2022-04-06 PROCEDURE — 99999 PR PBB SHADOW E&M-EST. PATIENT-LVL III: CPT | Mod: PBBFAC,,, | Performed by: INTERNAL MEDICINE

## 2022-04-06 PROCEDURE — 99204 PR OFFICE/OUTPT VISIT, NEW, LEVL IV, 45-59 MIN: ICD-10-PCS | Mod: S$GLB,,, | Performed by: INTERNAL MEDICINE

## 2022-04-06 PROCEDURE — 3078F PR MOST RECENT DIASTOLIC BLOOD PRESSURE < 80 MM HG: ICD-10-PCS | Mod: CPTII,S$GLB,, | Performed by: INTERNAL MEDICINE

## 2022-04-06 PROCEDURE — 99204 OFFICE O/P NEW MOD 45 MIN: CPT | Mod: S$GLB,,, | Performed by: INTERNAL MEDICINE

## 2022-04-06 RX ORDER — AMITRIPTYLINE HYDROCHLORIDE 10 MG/1
10 TABLET, FILM COATED ORAL NIGHTLY
Qty: 30 TABLET | Refills: 3 | Status: SHIPPED | OUTPATIENT
Start: 2022-04-06

## 2022-04-07 ENCOUNTER — PATIENT MESSAGE (OUTPATIENT)
Dept: GASTROENTEROLOGY | Facility: CLINIC | Age: 32
End: 2022-04-07
Payer: COMMERCIAL

## 2022-04-07 LAB — H PYLORI IGG SERPL QL IA: NEGATIVE

## 2022-04-27 ENCOUNTER — OFFICE VISIT (OUTPATIENT)
Dept: URGENT CARE | Facility: CLINIC | Age: 32
End: 2022-04-27
Payer: COMMERCIAL

## 2022-04-27 VITALS
SYSTOLIC BLOOD PRESSURE: 128 MMHG | WEIGHT: 155 LBS | HEART RATE: 86 BPM | HEIGHT: 62 IN | BODY MASS INDEX: 28.52 KG/M2 | RESPIRATION RATE: 16 BRPM | DIASTOLIC BLOOD PRESSURE: 68 MMHG | TEMPERATURE: 101 F | OXYGEN SATURATION: 99 %

## 2022-04-27 DIAGNOSIS — J10.1 INFLUENZA A: Primary | ICD-10-CM

## 2022-04-27 DIAGNOSIS — R50.9 FEVER, UNSPECIFIED FEVER CAUSE: ICD-10-CM

## 2022-04-27 DIAGNOSIS — Z11.59 ENCOUNTER FOR SCREENING FOR OTHER VIRAL DISEASES: ICD-10-CM

## 2022-04-27 LAB
CTP QC/QA: YES
CTP QC/QA: YES
POC MOLECULAR INFLUENZA A AGN: POSITIVE
POC MOLECULAR INFLUENZA B AGN: NEGATIVE
SARS-COV-2 RDRP RESP QL NAA+PROBE: NEGATIVE

## 2022-04-27 PROCEDURE — 3008F BODY MASS INDEX DOCD: CPT | Mod: CPTII,S$GLB,, | Performed by: NURSE PRACTITIONER

## 2022-04-27 PROCEDURE — 3074F SYST BP LT 130 MM HG: CPT | Mod: CPTII,S$GLB,, | Performed by: NURSE PRACTITIONER

## 2022-04-27 PROCEDURE — 1159F PR MEDICATION LIST DOCUMENTED IN MEDICAL RECORD: ICD-10-PCS | Mod: CPTII,S$GLB,, | Performed by: NURSE PRACTITIONER

## 2022-04-27 PROCEDURE — 3078F PR MOST RECENT DIASTOLIC BLOOD PRESSURE < 80 MM HG: ICD-10-PCS | Mod: CPTII,S$GLB,, | Performed by: NURSE PRACTITIONER

## 2022-04-27 PROCEDURE — 99213 OFFICE O/P EST LOW 20 MIN: CPT | Mod: S$GLB,CS,, | Performed by: NURSE PRACTITIONER

## 2022-04-27 PROCEDURE — 87502 INFLUENZA DNA AMP PROBE: CPT | Mod: QW,S$GLB,, | Performed by: NURSE PRACTITIONER

## 2022-04-27 PROCEDURE — 87502 POCT INFLUENZA A/B MOLECULAR: ICD-10-PCS | Mod: QW,S$GLB,, | Performed by: NURSE PRACTITIONER

## 2022-04-27 PROCEDURE — 1159F MED LIST DOCD IN RCRD: CPT | Mod: CPTII,S$GLB,, | Performed by: NURSE PRACTITIONER

## 2022-04-27 PROCEDURE — 3074F PR MOST RECENT SYSTOLIC BLOOD PRESSURE < 130 MM HG: ICD-10-PCS | Mod: CPTII,S$GLB,, | Performed by: NURSE PRACTITIONER

## 2022-04-27 PROCEDURE — U0002: ICD-10-PCS | Mod: QW,S$GLB,, | Performed by: NURSE PRACTITIONER

## 2022-04-27 PROCEDURE — 99213 PR OFFICE/OUTPT VISIT, EST, LEVL III, 20-29 MIN: ICD-10-PCS | Mod: S$GLB,CS,, | Performed by: NURSE PRACTITIONER

## 2022-04-27 PROCEDURE — U0002 COVID-19 LAB TEST NON-CDC: HCPCS | Mod: QW,S$GLB,, | Performed by: NURSE PRACTITIONER

## 2022-04-27 PROCEDURE — 3008F PR BODY MASS INDEX (BMI) DOCUMENTED: ICD-10-PCS | Mod: CPTII,S$GLB,, | Performed by: NURSE PRACTITIONER

## 2022-04-27 PROCEDURE — 3078F DIAST BP <80 MM HG: CPT | Mod: CPTII,S$GLB,, | Performed by: NURSE PRACTITIONER

## 2022-04-27 RX ORDER — IBUPROFEN 200 MG
600 TABLET ORAL
Status: COMPLETED | OUTPATIENT
Start: 2022-04-27 | End: 2022-04-27

## 2022-04-27 RX ORDER — OSELTAMIVIR PHOSPHATE 75 MG/1
75 CAPSULE ORAL 2 TIMES DAILY
Qty: 10 CAPSULE | Refills: 0 | Status: SHIPPED | OUTPATIENT
Start: 2022-04-27 | End: 2022-05-02

## 2022-04-27 RX ADMIN — Medication 600 MG: at 02:04

## 2022-04-27 NOTE — PATIENT INSTRUCTIONS
Take Tamiflu as directed    Use an antihistamine such as Claritin, Zyrtec or Allegra to dry you out.     Use pseudoephedrine (behind the counter) to decongest. Pseudoephedrine  30 mg up to 240 mg /day. It can raise your blood pressure and give you palpitations.    Use warm salt water gargles to ease your throat pain. Hot tea with honey.     Over the counter you can use Tylenol (acetominophen) or Ibuprofen for your minor aches and pains as long as you have no contraindications.    Good nutrition. Lots of rest. Plenty of fluids     You must understand that you've received an Urgent Care treatment only and that you may be released before all your medical problems are known or treated. You, the patient, will arrange for follow up care as instructed.  Follow up with your PCP or specialty clinic as directed in the next 1-2 weeks if not improved or as needed.  You can call [phone] to schedule an appointment with the appropriate provider.  If your condition worsens we recommend that you receive another evaluation at the emergency room immediately or contact your primary medical clinics after hours call service to discuss your concerns.  Please return here or go to the Emergency Department for any concerns or worsening of condition.

## 2022-04-27 NOTE — PROGRESS NOTES
"Subjective:       Patient ID: Bladimir Bernal is a 31 y.o. female.    Vitals:  height is 5' 2" (1.575 m) and weight is 70.3 kg (155 lb). Her tympanic temperature is 100.7 °F (38.2 °C) (abnormal). Her blood pressure is 128/68 and her pulse is 86. Her respiration is 16 and oxygen saturation is 99%.     Chief Complaint: Headache (Woke up feeling extremely ill. Bad headache. Whole body is achy & tired. Lymph nodes seem swollen. Coughing up green phlegm. Hoping for a strep test + testing for anything else this might be. Want to make sure I'm not contagious. - Entered by patient)    Patient reports headache started this morning.Patient reports she had both covid shots and Covid booster.    Headache   This is a new problem. The current episode started today. The problem occurs constantly. The pain is located in the frontal region. The pain does not radiate. The pain quality is similar to prior headaches. The quality of the pain is described as dull and aching. The pain is at a severity of 2/10. Associated symptoms include coughing (productive), a sore throat and swollen glands. Pertinent negatives include no sinus pressure. Associated symptoms comments: Fatigue  Body ache  chills. Nothing aggravates the symptoms. She has tried nothing for the symptoms. There is no history of migraine headaches.       HENT: Positive for sore throat. Negative for sinus pressure.    Respiratory: Positive for cough (productive).    Neurological: Positive for headaches. Negative for history of migraines.       Objective:      Physical Exam   Constitutional: She is oriented to person, place, and time. She appears well-developed. She is cooperative.  Non-toxic appearance. She does not appear ill. No distress.      Comments:febrile   HENT:   Head: Normocephalic and atraumatic.   Ears:   Right Ear: Hearing, tympanic membrane, external ear and ear canal normal.   Left Ear: Hearing, tympanic membrane, external ear and ear canal normal.   Nose: Nose " normal. No mucosal edema, rhinorrhea or nasal deformity. No epistaxis. Right sinus exhibits no maxillary sinus tenderness and no frontal sinus tenderness. Left sinus exhibits no maxillary sinus tenderness and no frontal sinus tenderness.   Mouth/Throat: Uvula is midline, oropharynx is clear and moist and mucous membranes are normal. No trismus in the jaw. Normal dentition. No uvula swelling. Cobblestoning present. No posterior oropharyngeal erythema.   PND      Comments: PND  Eyes: Conjunctivae and lids are normal. Right eye exhibits no discharge. Left eye exhibits no discharge. No scleral icterus.   Neck: Trachea normal and phonation normal. Neck supple.   Cardiovascular: Normal rate, regular rhythm, normal heart sounds and normal pulses.   Pulmonary/Chest: Effort normal and breath sounds normal. No respiratory distress.   Lungs clear bilaterally    Comments: Lungs clear bilaterally    Abdominal: Normal appearance and bowel sounds are normal. She exhibits no distension and no mass. Soft. There is no abdominal tenderness.   Musculoskeletal: Normal range of motion.         General: No deformity. Normal range of motion.   Neurological: She is alert and oriented to person, place, and time. She exhibits normal muscle tone. Coordination normal.   Skin: Skin is warm, dry, intact, not diaphoretic and not pale.   Psychiatric: Her speech is normal and behavior is normal. Judgment and thought content normal.   Nursing note and vitals reviewed.        Results for orders placed or performed in visit on 04/27/22   POCT COVID-19 Rapid Screening   Result Value Ref Range    POC Rapid COVID Negative Negative     Acceptable Yes    POCT Influenza A/B MOLECULAR   Result Value Ref Range    POC Molecular Influenza A Ag Positive (A) Negative, Not Reported    POC Molecular Influenza B Ag Negative Negative, Not Reported     Acceptable Yes      Assessment:       1. Influenza A    2. Encounter for screening for  other viral diseases    3. Fever, unspecified fever cause          Plan:         Influenza A  -     oseltamivir (TAMIFLU) 75 MG capsule; Take 1 capsule (75 mg total) by mouth 2 (two) times daily. for 5 days  Dispense: 10 capsule; Refill: 0    Encounter for screening for other viral diseases  -     Cancel: POCT COVID-19 Rapid Screening  -     POCT COVID-19 Rapid Screening    Fever, unspecified fever cause  -     POCT Influenza A/B MOLECULAR  -     ibuprofen tablet 600 mg      Patient Instructions   Take Tamiflu as directed    Use an antihistamine such as Claritin, Zyrtec or Allegra to dry you out.     Use pseudoephedrine (behind the counter) to decongest. Pseudoephedrine  30 mg up to 240 mg /day. It can raise your blood pressure and give you palpitations.    Use warm salt water gargles to ease your throat pain. Hot tea with honey.     Over the counter you can use Tylenol (acetominophen) or Ibuprofen for your minor aches and pains as long as you have no contraindications.    Good nutrition. Lots of rest. Plenty of fluids     You must understand that you've received an Urgent Care treatment only and that you may be released before all your medical problems are known or treated. You, the patient, will arrange for follow up care as instructed.  Follow up with your PCP or specialty clinic as directed in the next 1-2 weeks if not improved or as needed.  You can call [phone] to schedule an appointment with the appropriate provider.  If your condition worsens we recommend that you receive another evaluation at the emergency room immediately or contact your primary medical clinics after hours call service to discuss your concerns.  Please return here or go to the Emergency Department for any concerns or worsening of condition.

## 2022-04-27 NOTE — LETTER
April 27, 2022      Urgent Care [address] [address]  Phone: [phone]  Fax: [phone]       Patient: Bladimir Bernal   YOB: 1990  Date of Visit: 04/27/2022    To Whom It May Concern:    Lilian Bernal  was at [facility] on 04/27/2022. The patient may return to work/school on 5/2/22 with no restrictions. If you have any questions or concerns, or if I can be of further assistance, please do not hesitate to contact me.    Sincerely,    Erin Burch NP

## 2022-05-03 ENCOUNTER — LAB VISIT (OUTPATIENT)
Dept: LAB | Facility: OTHER | Age: 32
End: 2022-05-03
Attending: STUDENT IN AN ORGANIZED HEALTH CARE EDUCATION/TRAINING PROGRAM
Payer: COMMERCIAL

## 2022-05-03 ENCOUNTER — OFFICE VISIT (OUTPATIENT)
Dept: INTERNAL MEDICINE | Facility: CLINIC | Age: 32
End: 2022-05-03
Payer: COMMERCIAL

## 2022-05-03 VITALS
HEART RATE: 71 BPM | SYSTOLIC BLOOD PRESSURE: 94 MMHG | OXYGEN SATURATION: 99 % | WEIGHT: 151.88 LBS | DIASTOLIC BLOOD PRESSURE: 66 MMHG | BODY MASS INDEX: 27.95 KG/M2 | HEIGHT: 62 IN

## 2022-05-03 DIAGNOSIS — Z00.00 ANNUAL PHYSICAL EXAM: Primary | ICD-10-CM

## 2022-05-03 DIAGNOSIS — Z00.00 ANNUAL PHYSICAL EXAM: ICD-10-CM

## 2022-05-03 DIAGNOSIS — Z11.3 SCREENING FOR STD (SEXUALLY TRANSMITTED DISEASE): ICD-10-CM

## 2022-05-03 DIAGNOSIS — F43.9 STRESS: ICD-10-CM

## 2022-05-03 PROBLEM — M94.0 COSTOCHONDRITIS: Status: RESOLVED | Noted: 2021-05-28 | Resolved: 2022-05-03

## 2022-05-03 PROBLEM — N64.3 GALACTORRHEA: Status: RESOLVED | Noted: 2021-05-28 | Resolved: 2022-05-03

## 2022-05-03 LAB
ALBUMIN SERPL BCP-MCNC: 4.4 G/DL (ref 3.5–5.2)
ALP SERPL-CCNC: 65 U/L (ref 55–135)
ALT SERPL W/O P-5'-P-CCNC: 8 U/L (ref 10–44)
ANION GAP SERPL CALC-SCNC: 10 MMOL/L (ref 8–16)
AST SERPL-CCNC: 13 U/L (ref 10–40)
BASOPHILS # BLD AUTO: 0.04 K/UL (ref 0–0.2)
BASOPHILS NFR BLD: 0.5 % (ref 0–1.9)
BILIRUB SERPL-MCNC: 0.7 MG/DL (ref 0.1–1)
BUN SERPL-MCNC: 10 MG/DL (ref 6–20)
CALCIUM SERPL-MCNC: 10.1 MG/DL (ref 8.7–10.5)
CHLORIDE SERPL-SCNC: 107 MMOL/L (ref 95–110)
CHOLEST SERPL-MCNC: 160 MG/DL (ref 120–199)
CHOLEST/HDLC SERPL: 3.2 {RATIO} (ref 2–5)
CO2 SERPL-SCNC: 27 MMOL/L (ref 23–29)
CREAT SERPL-MCNC: 1.1 MG/DL (ref 0.5–1.4)
DIFFERENTIAL METHOD: NORMAL
EOSINOPHIL # BLD AUTO: 0.1 K/UL (ref 0–0.5)
EOSINOPHIL NFR BLD: 0.7 % (ref 0–8)
ERYTHROCYTE [DISTWIDTH] IN BLOOD BY AUTOMATED COUNT: 11.7 % (ref 11.5–14.5)
EST. GFR  (AFRICAN AMERICAN): >60 ML/MIN/1.73 M^2
EST. GFR  (NON AFRICAN AMERICAN): >60 ML/MIN/1.73 M^2
ESTIMATED AVG GLUCOSE: 97 MG/DL (ref 68–131)
GLUCOSE SERPL-MCNC: 75 MG/DL (ref 70–110)
HBA1C MFR BLD: 5 % (ref 4–5.6)
HCT VFR BLD AUTO: 43.5 % (ref 37–48.5)
HDLC SERPL-MCNC: 50 MG/DL (ref 40–75)
HDLC SERPL: 31.3 % (ref 20–50)
HGB BLD-MCNC: 14.6 G/DL (ref 12–16)
IMM GRANULOCYTES # BLD AUTO: 0.03 K/UL (ref 0–0.04)
IMM GRANULOCYTES NFR BLD AUTO: 0.4 % (ref 0–0.5)
LDLC SERPL CALC-MCNC: 95.2 MG/DL (ref 63–159)
LYMPHOCYTES # BLD AUTO: 1.8 K/UL (ref 1–4.8)
LYMPHOCYTES NFR BLD: 24.7 % (ref 18–48)
MCH RBC QN AUTO: 30.4 PG (ref 27–31)
MCHC RBC AUTO-ENTMCNC: 33.6 G/DL (ref 32–36)
MCV RBC AUTO: 90 FL (ref 82–98)
MONOCYTES # BLD AUTO: 0.6 K/UL (ref 0.3–1)
MONOCYTES NFR BLD: 7.9 % (ref 4–15)
NEUTROPHILS # BLD AUTO: 4.9 K/UL (ref 1.8–7.7)
NEUTROPHILS NFR BLD: 65.8 % (ref 38–73)
NONHDLC SERPL-MCNC: 110 MG/DL
NRBC BLD-RTO: 0 /100 WBC
PLATELET # BLD AUTO: 293 K/UL (ref 150–450)
PMV BLD AUTO: 10 FL (ref 9.2–12.9)
POTASSIUM SERPL-SCNC: 3.7 MMOL/L (ref 3.5–5.1)
PROT SERPL-MCNC: 7.3 G/DL (ref 6–8.4)
RBC # BLD AUTO: 4.81 M/UL (ref 4–5.4)
SODIUM SERPL-SCNC: 144 MMOL/L (ref 136–145)
TRIGL SERPL-MCNC: 74 MG/DL (ref 30–150)
TSH SERPL DL<=0.005 MIU/L-ACNC: 0.64 UIU/ML (ref 0.4–4)
WBC # BLD AUTO: 7.38 K/UL (ref 3.9–12.7)

## 2022-05-03 PROCEDURE — 3078F PR MOST RECENT DIASTOLIC BLOOD PRESSURE < 80 MM HG: ICD-10-PCS | Mod: CPTII,S$GLB,, | Performed by: STUDENT IN AN ORGANIZED HEALTH CARE EDUCATION/TRAINING PROGRAM

## 2022-05-03 PROCEDURE — 99395 PREV VISIT EST AGE 18-39: CPT | Mod: S$GLB,,, | Performed by: STUDENT IN AN ORGANIZED HEALTH CARE EDUCATION/TRAINING PROGRAM

## 2022-05-03 PROCEDURE — 99999 PR PBB SHADOW E&M-EST. PATIENT-LVL III: CPT | Mod: PBBFAC,,, | Performed by: STUDENT IN AN ORGANIZED HEALTH CARE EDUCATION/TRAINING PROGRAM

## 2022-05-03 PROCEDURE — 80061 LIPID PANEL: CPT | Performed by: STUDENT IN AN ORGANIZED HEALTH CARE EDUCATION/TRAINING PROGRAM

## 2022-05-03 PROCEDURE — 3074F PR MOST RECENT SYSTOLIC BLOOD PRESSURE < 130 MM HG: ICD-10-PCS | Mod: CPTII,S$GLB,, | Performed by: STUDENT IN AN ORGANIZED HEALTH CARE EDUCATION/TRAINING PROGRAM

## 2022-05-03 PROCEDURE — 3078F DIAST BP <80 MM HG: CPT | Mod: CPTII,S$GLB,, | Performed by: STUDENT IN AN ORGANIZED HEALTH CARE EDUCATION/TRAINING PROGRAM

## 2022-05-03 PROCEDURE — 84443 ASSAY THYROID STIM HORMONE: CPT | Performed by: STUDENT IN AN ORGANIZED HEALTH CARE EDUCATION/TRAINING PROGRAM

## 2022-05-03 PROCEDURE — 3074F SYST BP LT 130 MM HG: CPT | Mod: CPTII,S$GLB,, | Performed by: STUDENT IN AN ORGANIZED HEALTH CARE EDUCATION/TRAINING PROGRAM

## 2022-05-03 PROCEDURE — 85025 COMPLETE CBC W/AUTO DIFF WBC: CPT | Performed by: STUDENT IN AN ORGANIZED HEALTH CARE EDUCATION/TRAINING PROGRAM

## 2022-05-03 PROCEDURE — 1159F PR MEDICATION LIST DOCUMENTED IN MEDICAL RECORD: ICD-10-PCS | Mod: CPTII,S$GLB,, | Performed by: STUDENT IN AN ORGANIZED HEALTH CARE EDUCATION/TRAINING PROGRAM

## 2022-05-03 PROCEDURE — 3008F BODY MASS INDEX DOCD: CPT | Mod: CPTII,S$GLB,, | Performed by: STUDENT IN AN ORGANIZED HEALTH CARE EDUCATION/TRAINING PROGRAM

## 2022-05-03 PROCEDURE — 86592 SYPHILIS TEST NON-TREP QUAL: CPT | Performed by: STUDENT IN AN ORGANIZED HEALTH CARE EDUCATION/TRAINING PROGRAM

## 2022-05-03 PROCEDURE — 36415 COLL VENOUS BLD VENIPUNCTURE: CPT | Performed by: STUDENT IN AN ORGANIZED HEALTH CARE EDUCATION/TRAINING PROGRAM

## 2022-05-03 PROCEDURE — 3008F PR BODY MASS INDEX (BMI) DOCUMENTED: ICD-10-PCS | Mod: CPTII,S$GLB,, | Performed by: STUDENT IN AN ORGANIZED HEALTH CARE EDUCATION/TRAINING PROGRAM

## 2022-05-03 PROCEDURE — 87389 HIV-1 AG W/HIV-1&-2 AB AG IA: CPT | Performed by: STUDENT IN AN ORGANIZED HEALTH CARE EDUCATION/TRAINING PROGRAM

## 2022-05-03 PROCEDURE — 80053 COMPREHEN METABOLIC PANEL: CPT | Performed by: STUDENT IN AN ORGANIZED HEALTH CARE EDUCATION/TRAINING PROGRAM

## 2022-05-03 PROCEDURE — 99999 PR PBB SHADOW E&M-EST. PATIENT-LVL III: ICD-10-PCS | Mod: PBBFAC,,, | Performed by: STUDENT IN AN ORGANIZED HEALTH CARE EDUCATION/TRAINING PROGRAM

## 2022-05-03 PROCEDURE — 1159F MED LIST DOCD IN RCRD: CPT | Mod: CPTII,S$GLB,, | Performed by: STUDENT IN AN ORGANIZED HEALTH CARE EDUCATION/TRAINING PROGRAM

## 2022-05-03 PROCEDURE — 80074 ACUTE HEPATITIS PANEL: CPT | Performed by: STUDENT IN AN ORGANIZED HEALTH CARE EDUCATION/TRAINING PROGRAM

## 2022-05-03 PROCEDURE — 83036 HEMOGLOBIN GLYCOSYLATED A1C: CPT | Performed by: STUDENT IN AN ORGANIZED HEALTH CARE EDUCATION/TRAINING PROGRAM

## 2022-05-03 PROCEDURE — 99395 PR PREVENTIVE VISIT,EST,18-39: ICD-10-PCS | Mod: S$GLB,,, | Performed by: STUDENT IN AN ORGANIZED HEALTH CARE EDUCATION/TRAINING PROGRAM

## 2022-05-03 NOTE — PROGRESS NOTES
[facility]    Subjective:       Patient ID: Bladimir Bernal is a 31 y.o. female.    Chief Complaint: lingering post cough, Fatigue, and Annual Exam      History was obtained from the patient and supplemented through chart review.  This patient is known to me.     HPI:    Patient is a 31 y.o. female presents for annual    Covid test positive early jan   Couldn;t leave the house for approx 2 weeks  Fatigue  Flu this past weekend  High stress, feels suppressed immune system  Stomach flu recently  Cough resolved    GI symptoms  Aunt with colon cancer  Stress, family  Low grade nausea in mornings before court; amytriptline  Referral Dr. Crespo    Anxiety  Joined [facility]  Had done therapy in the past  Referral psychology  Dr. Crespo    Lactation  1 year  No blood  With nipple stimulation, bilat? Pt unsure.    Prolactin normal 5/28/2021    Family history of mental illness  3 family members with suicide    Medical History  Past Medical History:   Diagnosis Date    IUD contraception     Mirena       Review of Systems   Constitutional: Positive for fatigue. Negative for fever.   HENT: Negative for trouble swallowing.    Respiratory: Negative for shortness of breath.    Cardiovascular: Negative for chest pain.   Gastrointestinal: Negative for constipation, diarrhea, nausea and vomiting.   Musculoskeletal: Negative for gait problem.   Neurological: Negative for dizziness and seizures.   Psychiatric/Behavioral: Negative for hallucinations.         Surgical hx, family hx, social hx   Have been reviewed      Current Outpatient Medications:     levonorgestrel (MIRENA IU), , Disp: , Rfl:     amitriptyline (ELAVIL) 10 MG tablet, Take 1 tablet (10 mg total) by mouth every evening. (Patient not taking: No sig reported), Disp: 30 tablet, Rfl: 3    diazePAM (VALIUM) 2 MG tablet, Take 1 tablet (2 mg total) by mouth On call Procedure for Anxiety. Take after signing consents for IUD placement (Patient not  "taking: No sig reported), Disp: 1 tablet, Rfl: 0    Current Facility-Administered Medications:     levonorgestreL (MIRENA) 20 mcg/24 hours (6 yrs) 52 mg IUD 1 Intra Uterine Device, 1 Intra Uterine Device, Intrauterine, , Aaliyah Rosenberg MD, 1 Intra Uterine Device at 08/13/21 1115    Objective:        Body mass index is 27.78 kg/m².  Vitals:    05/03/22 1611   BP: 94/66   Pulse: 71   SpO2: 99%   Weight: 68.9 kg (151 lb 14.4 oz)   Height: 5' 2" (1.575 m)   PainSc: 0-No pain     Physical Exam  Vitals and nursing note reviewed.   Constitutional:       General: She is not in acute distress.     Appearance: Normal appearance. She is not ill-appearing.   HENT:      Head: Normocephalic and atraumatic.   Eyes:      General: No scleral icterus.  Cardiovascular:      Rate and Rhythm: Normal rate and regular rhythm.      Heart sounds: Normal heart sounds.   Pulmonary:      Effort: Pulmonary effort is normal.   Abdominal:      General: There is no distension.   Musculoskeletal:         General: No deformity.      Cervical back: Normal range of motion.   Skin:     General: Skin is warm and dry.   Neurological:      Mental Status: She is alert and oriented to person, place, and time.   Psychiatric:         Behavior: Behavior normal.           Lab Results   Component Value Date    WBC 13.58 (H) 02/01/2022    HGB 14.5 02/01/2022    HCT 42.8 02/01/2022     02/01/2022    CHOL 166 05/28/2021    TRIG 45 05/28/2021    HDL 75 05/28/2021    ALT 12 02/01/2022    AST 13 02/01/2022     02/01/2022    K 3.7 02/01/2022     02/01/2022    CREATININE 0.8 02/01/2022    BUN 12 02/01/2022    CO2 22 (L) 02/01/2022       The ASCVD Risk score (Canal Fultongermaine LOW Jr., et al., 2013) failed to calculate for the following reasons:    The 2013 ASCVD risk score is only valid for ages 40 to 79    (Imaging have been independently reviewed)    Assessment:         1. Annual physical exam    2. Screening for STD (sexually transmitted disease)    3. " Stress          Plan:     Bladimir was seen today for lingering post cough, fatigue and annual exam.    Diagnoses and all orders for this visit:    Annual physical exam  -     Comprehensive Metabolic Panel; Future  -     Lipid Panel; Future  -     TSH; Future  -     CBC Auto Differential; Future  -     Hemoglobin A1C; Future    Screening for STD (sexually transmitted disease)  -     RPR; Future  -     HIV 1/2 Ag/Ab (4th Gen); Future  -     C. trachomatis/N. gonorrhoeae by AMP DNA [facility]; Urine; Future  -     HEPATITIS PANEL, ACUTE; Future    Stress  -     Ambulatory referral/consult to Psychology; Future        Health Maintenance  - Lipids: normal 5/2021  - A1C: normal 5/2021  - Colon Ca Screen: na  - Immunizations: received vaccine    Women's health  - Pap:  NILM normal 5/31/2021  - Mammo: na  - Dexa: na  - Contraception: IUD  - No concern for STDs    Follow up in about 1 year (around 5/3/2023). or sooner prn      Check labs    All medications were reviewed including potential side effects and risks/benefits.  Pt was counseled to call back if anything worsens or if questions arise.    Guanako Ascencio MD  Family Medicine  [facility]  [address]  [address]  [place] [address]  Phone [phone]  Fax [phone]

## 2022-05-04 LAB — RPR SER QL: NORMAL

## 2022-05-05 LAB
HAV IGM SERPL QL IA: NEGATIVE
HBV CORE IGM SERPL QL IA: NEGATIVE
HBV SURFACE AG SERPL QL IA: NEGATIVE
HCV AB SERPL QL IA: NEGATIVE
HIV 1+2 AB+HIV1 P24 AG SERPL QL IA: NEGATIVE

## 2022-05-19 ENCOUNTER — PATIENT MESSAGE (OUTPATIENT)
Dept: PSYCHIATRY | Facility: OTHER | Age: 32
End: 2022-05-19
Payer: COMMERCIAL

## 2022-05-30 ENCOUNTER — PATIENT MESSAGE (OUTPATIENT)
Dept: PAIN MEDICINE | Facility: CLINIC | Age: 32
End: 2022-05-30
Payer: COMMERCIAL

## 2022-07-11 ENCOUNTER — OFFICE VISIT (OUTPATIENT)
Dept: DERMATOLOGY | Facility: CLINIC | Age: 32
End: 2022-07-11
Payer: COMMERCIAL

## 2022-07-11 DIAGNOSIS — D18.01 CHERRY ANGIOMA: ICD-10-CM

## 2022-07-11 DIAGNOSIS — Z12.83 SCREENING EXAM FOR SKIN CANCER: ICD-10-CM

## 2022-07-11 DIAGNOSIS — L73.9 FOLLICULITIS: ICD-10-CM

## 2022-07-11 DIAGNOSIS — B07.8 OTHER VIRAL WARTS: Primary | ICD-10-CM

## 2022-07-11 DIAGNOSIS — L81.4 LENTIGO: ICD-10-CM

## 2022-07-11 DIAGNOSIS — D22.9 MULTIPLE BENIGN NEVI: ICD-10-CM

## 2022-07-11 PROCEDURE — 99999 PR PBB SHADOW E&M-EST. PATIENT-LVL III: CPT | Mod: PBBFAC,,, | Performed by: DERMATOLOGY

## 2022-07-11 PROCEDURE — 3044F HG A1C LEVEL LT 7.0%: CPT | Mod: CPTII,S$GLB,, | Performed by: DERMATOLOGY

## 2022-07-11 PROCEDURE — 17110 PR DESTRUCTION BENIGN LESIONS UP TO 14: ICD-10-PCS | Mod: S$GLB,,, | Performed by: DERMATOLOGY

## 2022-07-11 PROCEDURE — 3044F PR MOST RECENT HEMOGLOBIN A1C LEVEL <7.0%: ICD-10-PCS | Mod: CPTII,S$GLB,, | Performed by: DERMATOLOGY

## 2022-07-11 PROCEDURE — 99213 OFFICE O/P EST LOW 20 MIN: CPT | Mod: 25,S$GLB,, | Performed by: DERMATOLOGY

## 2022-07-11 PROCEDURE — 17110 DESTRUCTION B9 LES UP TO 14: CPT | Mod: S$GLB,,, | Performed by: DERMATOLOGY

## 2022-07-11 PROCEDURE — 99999 PR PBB SHADOW E&M-EST. PATIENT-LVL III: ICD-10-PCS | Mod: PBBFAC,,, | Performed by: DERMATOLOGY

## 2022-07-11 PROCEDURE — 1159F MED LIST DOCD IN RCRD: CPT | Mod: CPTII,S$GLB,, | Performed by: DERMATOLOGY

## 2022-07-11 PROCEDURE — 1159F PR MEDICATION LIST DOCUMENTED IN MEDICAL RECORD: ICD-10-PCS | Mod: CPTII,S$GLB,, | Performed by: DERMATOLOGY

## 2022-07-11 PROCEDURE — 99213 PR OFFICE/OUTPT VISIT, EST, LEVL III, 20-29 MIN: ICD-10-PCS | Mod: 25,S$GLB,, | Performed by: DERMATOLOGY

## 2022-07-11 NOTE — PROGRESS NOTES
Subjective:       Patient ID:  Bladimir Bernal is a 31 y.o. female who presents for   Chief Complaint   Patient presents with    Skin Check     Tbse      History of Present Illness: The patient presents for follow up of skin check.    The patient was last seen on: 04/09/2021 for cryosurgery to actinic keratoses which have resolved.     Other skin complaints: wart left hand and spot on the both lower legs         Review of Systems   Constitutional: Negative for fever, chills and fatigue.   Skin: Positive for daily sunscreen use. Negative for recent sunburn and wears hat.   Hematologic/Lymphatic: Does not bruise/bleed easily.        Objective:    Physical Exam   Constitutional: She appears well-developed and well-nourished. No distress.   Neurological: She is alert and oriented to person, place, and time. She is not disoriented.   Psychiatric: She has a normal mood and affect.   Skin:   Areas Examined (abnormalities noted in diagram):   Scalp / Hair Palpated and Inspected  Head / Face Inspection Performed  Neck Inspection Performed  Chest / Axilla Inspection Performed  Abdomen Inspection Performed  Genitals / Buttocks / Groin Inspection Performed  Back Inspection Performed  RUE Inspected  LUE Inspection Performed  RLE Inspected  LLE Inspection Performed  Nails and Digits Inspection Performed                           Diagram Legend     Erythematous scaling macule/papule c/w actinic keratosis       Vascular papule c/w angioma      Pigmented verrucoid papule/plaque c/w seborrheic keratosis      Yellow umbilicated papule c/w sebaceous hyperplasia      Irregularly shaped tan macule c/w lentigo     1-2 mm smooth white papules consistent with Milia      Movable subcutaneous cyst with punctum c/w epidermal inclusion cyst      Subcutaneous movable cyst c/w pilar cyst      Firm pink to brown papule c/w dermatofibroma      Pedunculated fleshy papule(s) c/w skin tag(s)      Evenly pigmented macule c/w junctional nevus      Mildly variegated pigmented, slightly irregular-bordered macule c/w mildly atypical nevus      Flesh colored to evenly pigmented papule c/w intradermal nevus       Pink pearly papule/plaque c/w basal cell carcinoma      Erythematous hyperkeratotic cursted plaque c/w SCC      Surgical scar with no sign of skin cancer recurrence      Open and closed comedones      Inflammatory papules and pustules      Verrucoid papule consistent consistent with wart     Erythematous eczematous patches and plaques     Dystrophic onycholytic nail with subungual debris c/w onychomycosis     Umbilicated papule    Erythematous-base heme-crusted tan verrucoid plaque consistent with inflamed seborrheic keratosis     Erythematous Silvery Scaling Plaque c/w Psoriasis     See annotation      Assessment / Plan:        Other viral warts  Cryosurgery procedure note:    Verbal consent from the patient is obtained including, but not limited to, risk of hypopigmentation/hyperpigmentation, scar, recurrence of lesion. Liquid nitrogen cryosurgery is applied to 2 verruca with prior paring, as detailed in the physical exam, to produce a freeze injury. 3 consecutive freeze thaw cycles are applied to each verruca. The patient is aware that blisters (possibly blood blisters) may form.    Start OtC 40% salicylic acid pads to area once daily      Screening exam for skin cancer  Patient instructed in importance in daily sun protection of at least spf 30. Sun avoidance and topical protection discussed.     Recommend  for daily use on face and neck.    Patient encouraged to wear hat for all outdoor exposure.     Also discussed sun protective clothing. TASC or columbia are good brands, UPF 50 or above. Recommend long sleeves when out in the sun.       Multiple benign nevi  TBSE body skin examination performed today including at least 12 points as noted in physical examination. No lesions suspicious for malignancy noted.  Reassurance provided.  Instructed patient  to observe lesion(s) for changes and follow up in clinic if changes are noted. Discussed ABCDE's of moles and brochure provided.    Lentigo  This is a benign hyperpigmented sun induced lesion. Recommend daily sun protection/avoidance and use of at least SPF 30, broad spectrum sunscreen (OTC drug) will reduce the number of new lesions. Treatment of these benign lesions are considered cosmetic.      Cherry angioma  This is a benign vascular lesion. Reassurance given. No treatment required.     Folliculitis  - start cleocin solution  - use bpo wash when needed when flaring         F/u 1 year TBSE or sooner if needed for verruca  No follow-ups on file.

## 2022-07-11 NOTE — PATIENT INSTRUCTIONS
Start OtC 40% salicylic acid pads to area once daily    CRYOSURGERY      Your doctor has used a method called cryosurgery to treat your skin condition. Cryosurgery refers to the use of very cold substances to treat a variety of skin conditions such as warts, pre-skin cancers, molluscum contagiosum, sun spots, and several benign growths. The substance we use in cryosurgery is liquid nitrogen and is so cold (-195 degrees Celsius) that is burns when administered.     Following treatment in the office, the skin may immediately burn and become red. You may find the area around the lesion is affected as well. It is sometimes necessary to treat not only the lesion, but a small area of the surrounding normal skin to achieve a good response.     A blister, and even a blood filled blister, may form after treatment.   This is a normal response. If the blister is painful, it is acceptable to sterilize a needle and with rubbing alcohol and gently pop the blister. It is important that you gently wash the area with soap and warm water as the blister fluid may contain wart virus if a wart was treated. Do no remove the roof of the blister.     The area treated can take anywhere from 1-3 weeks to heal. Healing time depends on the kind skin lesion treated, the location, and how aggressively the lesion was treated. It is recommended that the areas treated are covered with Vaseline or bacitracin ointment and a band-aid. If a band-aid is not practical, just ointment applied several times per day will do. Keeping these areas moist will speed the healing time.    Treatment with liquid nitrogen can leave a scar. In dark skin, it may be a light or dark scar, in light skin it may be a white or pink scar. These will generally fade with time, but may never go away completely.     If you have any concerns after your treatment, please feel free to call the office.       [address], [place] [address]/ [phone]  FAX/ [email or web address]

## 2022-07-22 PROBLEM — B07.8 OTHER VIRAL WARTS: Status: ACTIVE | Noted: 2022-07-22

## 2022-07-22 RX ORDER — CLINDAMYCIN PHOSPHATE 11.9 MG/ML
SOLUTION TOPICAL
Qty: 60 ML | Refills: 3 | Status: SHIPPED | OUTPATIENT
Start: 2022-07-22

## 2022-11-21 ENCOUNTER — OFFICE VISIT (OUTPATIENT)
Dept: INTERNAL MEDICINE | Facility: CLINIC | Age: 32
End: 2022-11-21
Payer: COMMERCIAL

## 2022-11-21 DIAGNOSIS — R21 RASH: Primary | ICD-10-CM

## 2022-11-21 PROCEDURE — 99214 OFFICE O/P EST MOD 30 MIN: CPT | Mod: 95,,, | Performed by: INTERNAL MEDICINE

## 2022-11-21 PROCEDURE — 1159F MED LIST DOCD IN RCRD: CPT | Mod: CPTII,95,, | Performed by: INTERNAL MEDICINE

## 2022-11-21 PROCEDURE — 99214 PR OFFICE/OUTPT VISIT, EST, LEVL IV, 30-39 MIN: ICD-10-PCS | Mod: 95,,, | Performed by: INTERNAL MEDICINE

## 2022-11-21 PROCEDURE — 3044F PR MOST RECENT HEMOGLOBIN A1C LEVEL <7.0%: ICD-10-PCS | Mod: CPTII,95,, | Performed by: INTERNAL MEDICINE

## 2022-11-21 PROCEDURE — 1160F RVW MEDS BY RX/DR IN RCRD: CPT | Mod: CPTII,95,, | Performed by: INTERNAL MEDICINE

## 2022-11-21 PROCEDURE — 1159F PR MEDICATION LIST DOCUMENTED IN MEDICAL RECORD: ICD-10-PCS | Mod: CPTII,95,, | Performed by: INTERNAL MEDICINE

## 2022-11-21 PROCEDURE — 3044F HG A1C LEVEL LT 7.0%: CPT | Mod: CPTII,95,, | Performed by: INTERNAL MEDICINE

## 2022-11-21 PROCEDURE — 1160F PR REVIEW ALL MEDS BY PRESCRIBER/CLIN PHARMACIST DOCUMENTED: ICD-10-PCS | Mod: CPTII,95,, | Performed by: INTERNAL MEDICINE

## 2022-11-21 RX ORDER — TRIAMCINOLONE ACETONIDE 5 MG/G
CREAM TOPICAL 2 TIMES DAILY
Qty: 15 G | Refills: 5 | Status: SHIPPED | OUTPATIENT
Start: 2022-11-21

## 2022-11-21 RX ORDER — METHYLPREDNISOLONE 4 MG/1
TABLET ORAL
Qty: 21 EACH | Refills: 0 | Status: SHIPPED | OUTPATIENT
Start: 2022-11-21 | End: 2022-12-12

## 2022-11-21 NOTE — PROGRESS NOTES
The patient location is: home  The chief complaint leading to consultation is: rash    Visit type: audiovisual    Face to Face time with patient: 10 minutes  10 minutes of total time spent on the encounter, which includes face to face time and non-face to face time preparing to see the patient (eg, review of tests), Obtaining and/or reviewing separately obtained history, Documenting clinical information in the electronic or other health record, Independently interpreting results (not separately reported) and communicating results to the patient/family/caregiver, or Care coordination (not separately reported).         Each patient to whom he or she provides medical services by telemedicine is:  (1) informed of the relationship between the physician and patient and the respective role of any other health care provider with respect to management of the patient; and (2) notified that he or she may decline to receive medical services by telemedicine and may withdraw from such care at any time.    [facility] Note    Chief Complaint      Chief Complaint   Patient presents with    Rash     History of Present Illness      Bladimir Bernal is a 32 y.o. female who presents today for rash.  Patient comes to appointment alone.    Noticed rash 8 days ago on inner right elbow. Was 2 little pimples, thought it may be a bite. Rash was very itchy. Since that time has spread around inner arm.  Using antifungal cream she had for ringworm without relief x 1 weeks.  Never had rash like this before. Has had poison ivy before.  No new detergents, body wash, perfume, new clothing.  First noticed it at a party at a hotel.    Problem List Items Addressed This Visit    None  Visit Diagnoses       Rash    -  Primary            Health Maintenance   Topic Date Due    TETANUS VACCINE  Never done    Hepatitis C Screening  Completed    Lipid Panel  Completed       Past Medical History:   Diagnosis Date    IUD contraception      Mirena       Past Surgical History:   Procedure Laterality Date    WISDOM TOOTH EXTRACTION         family history includes Colon cancer in her maternal aunt; Heart attack in her paternal grandfather; Hyperlipidemia in her mother; Hypertension in her father; Skin cancer in her father; Stroke in her maternal aunt; Suicide in her maternal aunt.    Social History     Tobacco Use    Smoking status: Never    Smokeless tobacco: Never   Substance Use Topics    Alcohol use: Yes     Alcohol/week: 4.0 - 5.0 standard drinks     Types: 4 - 5 Standard drinks or equivalent per week     Comment: 1-2 a week , social    Drug use: Yes     Types: Marijuana       Review of Systems   Constitutional:  Negative for chills and fever.   HENT:  Negative for congestion and sore throat.    Eyes:  Negative for pain.   Respiratory:  Negative for cough and shortness of breath.    Cardiovascular:  Negative for chest pain and palpitations.   Gastrointestinal:  Negative for constipation, diarrhea, nausea and vomiting.   Genitourinary:  Negative for dysuria and hematuria.   Musculoskeletal:  Negative for falls and joint pain.   Skin:  Positive for rash.   Neurological:  Negative for headaches.      Outpatient Encounter Medications as of 11/21/2022   Medication Sig Dispense Refill    amitriptyline (ELAVIL) 10 MG tablet Take 1 tablet (10 mg total) by mouth every evening. 30 tablet 3    clindamycin (CLEOCIN T) 1 % external solution AAA bid 60 mL 3    diazePAM (VALIUM) 2 MG tablet Take 1 tablet (2 mg total) by mouth On call Procedure for Anxiety. Take after signing consents for IUD placement (Patient not taking: No sig reported) 1 tablet 0    levonorgestrel (MIRENA IU)       methylPREDNISolone (MEDROL DOSEPACK) 4 mg tablet use as directed 21 each 0    triamcinolone acetonide 0.5% (KENALOG) 0.5 % Crea Apply topically 2 (two) times daily. 15 g 5     Facility-Administered Encounter Medications as of 11/21/2022   Medication Dose Route Frequency Provider Last  Rate Last Admin    levonorgestreL (MIRENA) 20 mcg/24 hours (6 yrs) 52 mg IUD 1 Intra Uterine Device  1 Intra Uterine Device Intrauterine  Aaliyah Rosenberg MD   1 Intra Uterine Device at 08/13/21 1115        Review of patient's allergies indicates:  No Known Allergies    Physical Exam       ]    Physical Exam  Constitutional:       General: She is not in acute distress.     Appearance: She is well-developed.   HENT:      Head: Normocephalic and atraumatic.   Pulmonary:      Effort: Pulmonary effort is normal.   Musculoskeletal:      Cervical back: Normal range of motion.   Skin:     Findings: No rash.   Neurological:      Mental Status: She is alert and oriented to person, place, and time.      Coordination: Coordination normal.   Psychiatric:         Behavior: Behavior normal.         Thought Content: Thought content normal.         Judgment: Judgment normal.        Laboratory:  CBC:  No results for input(s): WBC, RBC, HGB, HCT, PLT, MCV, MCH, MCHC in the last 2160 hours.  CMP:  No results for input(s): GLU, CALCIUM, ALBUMIN, PROT, NA, K, CO2, CL, BUN, ALKPHOS, ALT, AST, BILITOT in the last 2160 hours.    Invalid input(s): CREATININ  URINALYSIS:  No results for input(s): COLORU, CLARITYU, SPECGRAV, PHUR, PROTEINUA, GLUCOSEU, BILIRUBINCON, BLOODU, WBCU, RBCU, BACTERIA, MUCUS, NITRITE, LEUKOCYTESUR, UROBILINOGEN, HYALINECASTS in the last 2160 hours.   LIPIDS:  No results for input(s): TSH, HDL, CHOL, TRIG, LDLCALC, CHOLHDL, NONHDLCHOL, TOTALCHOLEST in the last 2160 hours.  TSH:  No results for input(s): TSH in the last 2160 hours.  A1C:  No results for input(s): HGBA1C in the last 2160 hours.    Radiology:  No results found in the last 30 days.     Assessment/Plan     Bladimir Bernal is a 32 y.o.female with:    1. Rash    -zyrtec BID, will use plain dove/dial soap.   -medrol dose pack, cream for elbow lesions   -Continue current medications and maintain follow up with specialists.    -Follow up if symptoms  worsen or fail to improve.       Zara Carpenter MD  [facility]                  Answers submitted by the patient for this visit:  Rash Questionnaire (Submitted on 11/20/2022)  Chief Complaint: Rash  Chronicity: new  Onset: in the past 7 days  Progression since onset: gradually worsening  Affected locations: neck, left arm, right arm, right elbow, right upper leg  Characteristics: itchiness, scaling  Exposed to: unknown  anorexia: No  facial edema: No  fatigue: No  nail changes: No  rhinorrhea: No  Treatments tried: anti-itch cream  Improvement on treatment: no relief  asthma: No  allergies: No  eczema: No  varicella: Yes

## 2022-11-28 ENCOUNTER — PATIENT MESSAGE (OUTPATIENT)
Dept: INTERNAL MEDICINE | Facility: CLINIC | Age: 32
End: 2022-11-28
Payer: COMMERCIAL

## 2022-11-28 DIAGNOSIS — Z71.84 ENCOUNTER FOR COUNSELING FOR TRAVEL: Primary | ICD-10-CM

## 2022-11-29 ENCOUNTER — TELEPHONE (OUTPATIENT)
Dept: INFECTIOUS DISEASES | Facility: CLINIC | Age: 32
End: 2022-11-29
Payer: COMMERCIAL

## 2022-11-29 NOTE — TELEPHONE ENCOUNTER
----- Message from Cristina Mcdonnell MA sent at 11/29/2022 12:55 PM CST -----    ----- Message -----  From: Jacqueline Donovan MA  Sent: 11/29/2022  12:38 PM CST  To: [facility] Staff    Type:  Patient Returning Call    Who Called: pt  Does the patient know what this is regarding?: yes  Would the patient rather a call back or a response via [facility]?  Call back  Best Call Back Number: [phone]  Additional Information:  pt was not notified when scheduling was done for appt and would like to be rescheduled

## 2022-12-06 ENCOUNTER — CLINICAL SUPPORT (OUTPATIENT)
Dept: INFECTIOUS DISEASES | Facility: CLINIC | Age: 32
End: 2022-12-06

## 2022-12-06 ENCOUNTER — OFFICE VISIT (OUTPATIENT)
Dept: INFECTIOUS DISEASES | Facility: CLINIC | Age: 32
End: 2022-12-06
Payer: COMMERCIAL

## 2022-12-06 ENCOUNTER — TELEPHONE (OUTPATIENT)
Dept: INFECTIOUS DISEASES | Facility: CLINIC | Age: 32
End: 2022-12-06
Payer: COMMERCIAL

## 2022-12-06 VITALS
SYSTOLIC BLOOD PRESSURE: 117 MMHG | TEMPERATURE: 99 F | BODY MASS INDEX: 28.36 KG/M2 | HEART RATE: 98 BPM | DIASTOLIC BLOOD PRESSURE: 66 MMHG | WEIGHT: 154.13 LBS | HEIGHT: 62 IN

## 2022-12-06 DIAGNOSIS — Z71.84 ENCOUNTER FOR COUNSELING FOR TRAVEL: ICD-10-CM

## 2022-12-06 DIAGNOSIS — Z71.84 ENCOUNTER FOR COUNSELING FOR TRAVEL: Primary | ICD-10-CM

## 2022-12-06 PROCEDURE — 3008F PR BODY MASS INDEX (BMI) DOCUMENTED: ICD-10-PCS | Mod: CPTII,S$GLB,, | Performed by: INTERNAL MEDICINE

## 2022-12-06 PROCEDURE — 99999 PR PBB SHADOW E&M-EST. PATIENT-LVL III: CPT | Mod: PBBFAC,,, | Performed by: INTERNAL MEDICINE

## 2022-12-06 PROCEDURE — 99402 PR PREVENT COUNSEL,INDIV,30 MIN: ICD-10-PCS | Mod: 25,S$GLB,, | Performed by: INTERNAL MEDICINE

## 2022-12-06 PROCEDURE — 90691 TYPHOID VACCINE IM: CPT | Mod: S$GLB,,, | Performed by: INTERNAL MEDICINE

## 2022-12-06 PROCEDURE — 90691 TYPHOID VICPS VACCINE IM: ICD-10-PCS | Mod: S$GLB,,, | Performed by: INTERNAL MEDICINE

## 2022-12-06 PROCEDURE — 99999 PR PBB SHADOW E&M-EST. PATIENT-LVL I: CPT | Mod: PBBFAC,,,

## 2022-12-06 PROCEDURE — 3008F BODY MASS INDEX DOCD: CPT | Mod: CPTII,S$GLB,, | Performed by: INTERNAL MEDICINE

## 2022-12-06 PROCEDURE — 3074F PR MOST RECENT SYSTOLIC BLOOD PRESSURE < 130 MM HG: ICD-10-PCS | Mod: CPTII,S$GLB,, | Performed by: INTERNAL MEDICINE

## 2022-12-06 PROCEDURE — 1159F MED LIST DOCD IN RCRD: CPT | Mod: CPTII,S$GLB,, | Performed by: INTERNAL MEDICINE

## 2022-12-06 PROCEDURE — 90471 IMMUNIZATION ADMIN: CPT | Mod: S$GLB,,, | Performed by: INTERNAL MEDICINE

## 2022-12-06 PROCEDURE — 1159F PR MEDICATION LIST DOCUMENTED IN MEDICAL RECORD: ICD-10-PCS | Mod: CPTII,S$GLB,, | Performed by: INTERNAL MEDICINE

## 2022-12-06 PROCEDURE — 90471 TYPHOID VICPS VACCINE IM: ICD-10-PCS | Mod: S$GLB,,, | Performed by: INTERNAL MEDICINE

## 2022-12-06 PROCEDURE — 3078F PR MOST RECENT DIASTOLIC BLOOD PRESSURE < 80 MM HG: ICD-10-PCS | Mod: CPTII,S$GLB,, | Performed by: INTERNAL MEDICINE

## 2022-12-06 PROCEDURE — 3044F HG A1C LEVEL LT 7.0%: CPT | Mod: CPTII,S$GLB,, | Performed by: INTERNAL MEDICINE

## 2022-12-06 PROCEDURE — 99402 PREV MED CNSL INDIV APPRX 30: CPT | Mod: 25,S$GLB,, | Performed by: INTERNAL MEDICINE

## 2022-12-06 PROCEDURE — 3044F PR MOST RECENT HEMOGLOBIN A1C LEVEL <7.0%: ICD-10-PCS | Mod: CPTII,S$GLB,, | Performed by: INTERNAL MEDICINE

## 2022-12-06 PROCEDURE — 3074F SYST BP LT 130 MM HG: CPT | Mod: CPTII,S$GLB,, | Performed by: INTERNAL MEDICINE

## 2022-12-06 PROCEDURE — 99999 PR PBB SHADOW E&M-EST. PATIENT-LVL III: ICD-10-PCS | Mod: PBBFAC,,, | Performed by: INTERNAL MEDICINE

## 2022-12-06 PROCEDURE — 3078F DIAST BP <80 MM HG: CPT | Mod: CPTII,S$GLB,, | Performed by: INTERNAL MEDICINE

## 2022-12-06 PROCEDURE — 99999 PR PBB SHADOW E&M-EST. PATIENT-LVL I: ICD-10-PCS | Mod: PBBFAC,,,

## 2022-12-06 RX ORDER — ATOVAQUONE AND PROGUANIL HYDROCHLORIDE 250; 100 MG/1; MG/1
1 TABLET, FILM COATED ORAL DAILY
Qty: 60 TABLET | Refills: 0 | Status: SHIPPED | OUTPATIENT
Start: 2022-12-06 | End: 2023-02-04

## 2022-12-06 NOTE — PROGRESS NOTES
INFECTIOUS DISEASE TRAVEL CLINIC  12/06/2022 3:48 PM    Subjective:      Chief Complaint:   Chief Complaint   Patient presents with    Travel Consult       History of Present Illness    Patient Bladimir Bernal is a 32 y.o. female who presents today for routine pretravel consultation.  The patient reports a past medical history of NA.   The patient will be traveling to [facility] ([place] x 1 day), [facility] ([facility]), [facility] (for a wedding, safari and a river canoe), [facility]. She thinks she will be travelling from 12/10- 1/13, but will likely extend her trip. The purpose of this trip is pleasure.  The patient will be lodging at a resort, friend's homes, outdoor camping.      The patient has travelled to the following other countries in the past;  [place], [place], [place], [place], [place], etc. The patient reports that they did receive all their childhood vaccinations.  The patient reports receipt of the following travel related vaccinations; thinks she received hep A when she did her study abroad.    Works as a .    Have you ever received:  YES NO   Routine Childhood vaccines  [x]         []       Influenza vaccine   [x]         []     Prevnar    []         [x]     Pneumovax    []         [x]     Tetanus-diptheria -pertussis  [x]         []     Hepatitis A vaccine series       [x]         []     Hepatitis B vaccine series         [x]         []     Meningitis vaccine   []         [x]     Zoster vaccine    []         [x]    Typhoid vaccine   []         [x]   Yellow fever vaccine   []         [x]   Japanese encephalitis vaccine []         [x]   Rabies vaccine   []         [x]     Med: IUD       Review of Symptoms:  Constitutional: Denies fevers, chills, or weakness.  Cardiovascular: Denies chest pain, palpitations  Respiratory: Denies shortness of breath, cough, hemoptysis  GI: Denies nausea/vomitting,, abd pain  : Denies dysuria  Musculoskeletal: Denies joint pain or  myalgias.  Skin/breast: Denies rashes, lumps, lesions, or discharge.  Neurologic: Denies headache     Past Medical History:   Diagnosis Date    IUD contraception     Mirena       Past Surgical History:   Procedure Laterality Date    WISDOM TOOTH EXTRACTION         Family History   Problem Relation Age of Onset    Heart attack Paternal Grandfather     Hypertension Father     Skin cancer Father         melanoma    Hyperlipidemia Mother     Colon cancer Maternal Aunt     Stroke Maternal Aunt     Suicide Maternal Aunt     Breast cancer Neg Hx     Eclampsia Neg Hx      labor Neg Hx     Miscarriages / Stillbirths Neg Hx     Ovarian cancer Neg Hx     Esophageal cancer Neg Hx        Social History     Socioeconomic History    Marital status: Single   Tobacco Use    Smoking status: Never    Smokeless tobacco: Never   Substance and Sexual Activity    Alcohol use: Yes     Alcohol/week: 4.0 - 5.0 standard drinks     Types: 4 - 5 Standard drinks or equivalent per week     Comment: 1-2 a week , social    Drug use: Yes     Types: Marijuana    Sexual activity: Yes     Partners: Male, Female     Birth control/protection: I.U.D.       Review of patient's allergies indicates:  No Known Allergies      Objective:   VS (24h):   Vitals:    22 1543   BP: 117/66   Pulse: 98   Temp: 98.8 °F (37.1 °C)     General: Afebrile, alert, comfortable, no acute distress.   HEENT: CONNIE. EOMI, no scleral icterus.   Pulmonary: Non labored  Extremities: Moves all extremities x 4.   Skin: No jaundice, rashes, or visible lesions.   Neurological:  Alert and oriented x 4.     Glucose   Date Value Ref Range Status   2022 75 70 - 110 mg/dL Final   2022 89 70 - 110 mg/dL Final   2021 80 70 - 110 mg/dL Final       Calcium   Date Value Ref Range Status   2022 10.1 8.7 - 10.5 mg/dL Final   2022 10.0 8.7 - 10.5 mg/dL Final   2021 9.9 8.7 - 10.5 mg/dL Final       Albumin   Date Value Ref Range Status   2022 4.4  3.5 - 5.2 g/dL Final   02/01/2022 4.4 3.5 - 5.2 g/dL Final   05/28/2021 4.4 3.5 - 5.2 g/dL Final       Total Protein   Date Value Ref Range Status   05/03/2022 7.3 6.0 - 8.4 g/dL Final   02/01/2022 8.0 6.0 - 8.4 g/dL Final   05/28/2021 7.5 6.0 - 8.4 g/dL Final       Sodium   Date Value Ref Range Status   05/03/2022 144 136 - 145 mmol/L Final   02/01/2022 142 136 - 145 mmol/L Final   05/28/2021 140 136 - 145 mmol/L Final       Potassium   Date Value Ref Range Status   05/03/2022 3.7 3.5 - 5.1 mmol/L Final   02/01/2022 3.7 3.5 - 5.1 mmol/L Final   05/28/2021 4.3 3.5 - 5.1 mmol/L Final       CO2   Date Value Ref Range Status   05/03/2022 27 23 - 29 mmol/L Final   02/01/2022 22 (L) 23 - 29 mmol/L Final   05/28/2021 22 (L) 23 - 29 mmol/L Final       Chloride   Date Value Ref Range Status   05/03/2022 107 95 - 110 mmol/L Final   02/01/2022 107 95 - 110 mmol/L Final   05/28/2021 108 95 - 110 mmol/L Final       BUN   Date Value Ref Range Status   05/03/2022 10 6 - 20 mg/dL Final   02/01/2022 12 6 - 20 mg/dL Final   05/28/2021 6 6 - 20 mg/dL Final       Creatinine   Date Value Ref Range Status   05/03/2022 1.1 0.5 - 1.4 mg/dL Final   02/01/2022 0.8 0.5 - 1.4 mg/dL Final   05/28/2021 0.8 0.5 - 1.4 mg/dL Final       Alkaline Phosphatase   Date Value Ref Range Status   05/03/2022 65 55 - 135 U/L Final   02/01/2022 87 55 - 135 U/L Final   05/28/2021 66 55 - 135 U/L Final       ALT   Date Value Ref Range Status   05/03/2022 8 (L) 10 - 44 U/L Final   02/01/2022 12 10 - 44 U/L Final   05/28/2021 17 10 - 44 U/L Final       AST   Date Value Ref Range Status   05/03/2022 13 10 - 40 U/L Final   02/01/2022 13 10 - 40 U/L Final   05/28/2021 16 10 - 40 U/L Final       Total Bilirubin   Date Value Ref Range Status   05/03/2022 0.7 0.1 - 1.0 mg/dL Final     Comment:     For infants and newborns, interpretation of results should be based  on gestational age, weight and in agreement with clinical  observations.    Premature Infant recommended  reference ranges:  Up to 24 hours.............<8.0 mg/dL  Up to 48 hours............<12.0 mg/dL  3-5 days..................<15.0 mg/dL  6-29 days.................<15.0 mg/dL     02/01/2022 0.4 0.1 - 1.0 mg/dL Final     Comment:     For infants and newborns, interpretation of results should be based  on gestational age, weight and in agreement with clinical  observations.    Premature Infant recommended reference ranges:  Up to 24 hours.............<8.0 mg/dL  Up to 48 hours............<12.0 mg/dL  3-5 days..................<15.0 mg/dL  6-29 days.................<15.0 mg/dL     05/28/2021 1.0 0.1 - 1.0 mg/dL Final     Comment:     For infants and newborns, interpretation of results should be based  on gestational age, weight and in agreement with clinical  observations.    Premature Infant recommended reference ranges:  Up to 24 hours.............<8.0 mg/dL  Up to 48 hours............<12.0 mg/dL  3-5 days..................<15.0 mg/dL  6-29 days.................<15.0 mg/dL         WBC   Date Value Ref Range Status   05/03/2022 7.38 3.90 - 12.70 K/uL Final   02/01/2022 13.58 (H) 3.90 - 12.70 K/uL Final       Hemoglobin   Date Value Ref Range Status   05/03/2022 14.6 12.0 - 16.0 g/dL Final   02/01/2022 14.5 12.0 - 16.0 g/dL Final       Hematocrit   Date Value Ref Range Status   05/03/2022 43.5 37.0 - 48.5 % Final   02/01/2022 42.8 37.0 - 48.5 % Final       MCV   Date Value Ref Range Status   05/03/2022 90 82 - 98 fL Final   02/01/2022 88 82 - 98 fL Final       Platelets   Date Value Ref Range Status   05/03/2022 293 150 - 450 K/uL Final   02/01/2022 341 150 - 450 K/uL Final       Lab Results   Component Value Date    CHOL 160 05/03/2022    CHOL 166 05/28/2021       Lab Results   Component Value Date    HDL 50 05/03/2022    HDL 75 05/28/2021       Lab Results   Component Value Date    LDLCALC 95.2 05/03/2022    LDLCALC 82.0 05/28/2021       Lab Results   Component Value Date    TRIG 74 05/03/2022    TRIG 45 05/28/2021        Lab Results   Component Value Date    CHOLHDL 31.3 05/03/2022    CHOLHDL 45.2 05/28/2021       HIV: No components found for: HIV 1/2 AG/AB  Hepatitis C IgG: No components found for: HEPATITIS C  Syphilis:   RPR   Date Value Ref Range Status   05/03/2022 Non-reactive Non-reactive Final       Hepatitis A IgG: No components found for: HEPATITIS A IGG  Hepatitis Bc IgG: No components found for: HEPATITIS B CORE IGG  Hepatitis Bs IgG:  Quantiferon: No results found for: QUANTIFERON        Assessment:     Pre-Travel clinic assessment  Vaccine counseling    Plan:     Patient specific risks:      The patient was provided with an extensive travel guidance packet which provides travel information specific to the patients itinerary.     The patient's medical history was reviewed and the patient was counseled on:    Precautions against development of DVT during flight.    Registering with the state department and travel insurance was recommended in case of emergency.     Destination specific risks:      -Infectious Disease risks:      Mosquito Borne pathogens:  Reviewed basic mosquito avoidance precautions including wearing long sleeve clothing and insect repellant.  to prevent insect-borne diseases (e.g., malaria, dengue).The patient was also instructed to purchase insect repellent containing DEET (25-35%; higher strengths last longer, but >35% will damage synthetic materials) and apply premethrin spray on clothing according to repellent label instructions.    An anti-malarial agent was  prescribed for malaria prophylaxis and possible side effects were reviewed. Atovaquone-proguanil 250-100 mg PO qdaily, start 2 days before expected exposure and end 7 days after last day of exposure.    Food Borne pathogens:  Reviewed basic hand, food and water sanitation precautions.  Patient instructed to take hand  on their trip. The patient was instructed to purchase Imodium over the counter to take in case diarrhea  "(without blood or fever) develops. Advised to seek medical attention if develops severe/ bloody diarrhea.    STDs:  Risk of STDs reviewed with the patient. Discussed Personal protective measures     Rabies: Discussed the risk of rabies and precautions to prevent exposure. Patient is aware to seek prompt medical care should they be exposed.       -Environmental risks:     Personal and travel safety. Precautions to minimize risk/exposure to crime and motor vehicle accidents were reviewed with the patient.    Precautions against sun exposure.         -Vaccinations:  The patient's immunization history was reviewed and, based on the patient's itinerary, the following immunizations were ordered:     - Typhoid IM x1  - Tdap  - Flu       The patient was encouraged to contact us about any problems that may develop after immunization and possible side effects were reviewed.      The patient was instructed to contact us if problems develop after travel.    > 30 min spent with patient and >50% of time spent counseling about travel    Aneta Wood MD  Infectious Diseases      Today:     - Typhoid (good for 2 years)      At pharmacy:    Malaria prophylaxis  - Malarone, take one pill daily  Start 2 days prior to entering affected area, continue daily during travel, end 7 days after leaving affected area    Traveler's diarrhea  - bring imodium with you (available over the counter. If diarrhea severe, lasting longer than 4 days or blood in stool, seek medical attention.)      The "Simple Solution" - Home made Oral Rehydration Salts (ORS) Recipe:  Six (6) level teaspoons of Sugar.  Half (1/2) level teaspoon of Salt.  One Litre of clean drinking or boiled water and then cooled - 5 cupfuls (each cup about 200 ml.)    To view your immunizations given in [place], register for an account at: [email or web address]    Total professional time spent for the encounter: 30 minutes  Time was spent preparing to see the patient, " reviewing results of prior testing, obtaining and/or reviewing separately obtained history, performing a medically appropriate examination and interview, counseling and educating the patient/family, ordering medications/tests/procedures, referring and communicating with other health care professionals, documenting clinical information in the electronic health record, and independently interpreting results.

## 2022-12-08 ENCOUNTER — CLINICAL SUPPORT (OUTPATIENT)
Dept: INFECTIOUS DISEASES | Facility: CLINIC | Age: 32
End: 2022-12-08
Payer: COMMERCIAL

## 2022-12-08 PROCEDURE — 90471 IMMUNIZATION ADMIN: CPT | Mod: S$GLB,,, | Performed by: INTERNAL MEDICINE

## 2022-12-08 PROCEDURE — 90715 TDAP VACCINE 7 YRS/> IM: CPT | Mod: S$GLB,,, | Performed by: INTERNAL MEDICINE

## 2022-12-08 PROCEDURE — 90715 TDAP VACCINE GREATER THAN OR EQUAL TO 7YO IM: ICD-10-PCS | Mod: S$GLB,,, | Performed by: INTERNAL MEDICINE

## 2022-12-08 PROCEDURE — 90686 IIV4 VACC NO PRSV 0.5 ML IM: CPT | Mod: S$GLB,,, | Performed by: INTERNAL MEDICINE

## 2022-12-08 PROCEDURE — 90471 TDAP VACCINE GREATER THAN OR EQUAL TO 7YO IM: ICD-10-PCS | Mod: S$GLB,,, | Performed by: INTERNAL MEDICINE

## 2022-12-08 PROCEDURE — 90686 FLU VACCINE (QUAD) GREATER THAN OR EQUAL TO 3YO PRESERVATIVE FREE IM: ICD-10-PCS | Mod: S$GLB,,, | Performed by: INTERNAL MEDICINE

## 2022-12-08 PROCEDURE — 99999 PR PBB SHADOW E&M-EST. PATIENT-LVL I: ICD-10-PCS | Mod: PBBFAC,,,

## 2022-12-08 PROCEDURE — 90472 IMMUNIZATION ADMIN EACH ADD: CPT | Mod: S$GLB,,, | Performed by: INTERNAL MEDICINE

## 2022-12-08 PROCEDURE — 99999 PR PBB SHADOW E&M-EST. PATIENT-LVL I: CPT | Mod: PBBFAC,,,

## 2022-12-08 PROCEDURE — 90472 FLU VACCINE (QUAD) GREATER THAN OR EQUAL TO 3YO PRESERVATIVE FREE IM: ICD-10-PCS | Mod: S$GLB,,, | Performed by: INTERNAL MEDICINE

## 2022-12-08 NOTE — PROGRESS NOTES
Patient received 2 vaccines IM to the right deltoid, Tdap anterior, and Flu posterior.  Tolerated well and left in NAD

## 2023-01-29 ENCOUNTER — IMMUNIZATION (OUTPATIENT)
Dept: PRIMARY CARE CLINIC | Facility: CLINIC | Age: 33
End: 2023-01-29
Payer: COMMERCIAL

## 2023-01-29 DIAGNOSIS — Z23 NEED FOR VACCINATION: Primary | ICD-10-CM

## 2023-01-29 PROCEDURE — 91312 COVID-19, MRNA, LNP-S, BIVALENT BOOSTER, PF, 30 MCG/0.3 ML DOSE: CPT | Mod: PBBFAC | Performed by: INTERNAL MEDICINE

## 2023-01-29 PROCEDURE — 0124A COVID-19, MRNA, LNP-S, BIVALENT BOOSTER, PF, 30 MCG/0.3 ML DOSE: CPT | Mod: CV19,PBBFAC | Performed by: INTERNAL MEDICINE

## 2023-02-13 NOTE — PROGRESS NOTES
[facility]    Subjective:       Patient ID: Bladimir Bernal is a 32 y.o. female.    Chief Complaint: No chief complaint on file.      History was obtained from the patient and supplemented through chart review.  This patient is known to me.     HPI:    Patient is a 32 y.o. female presents for contact dermatitis and potential allergy to cat    Covid test positive early jan   Couldn;t leave the house for approx 2 weeks  Fatigue  Flu this past weekend  High stress, feels suppressed immune system  Stomach flu recently  Cough resolved    GI symptoms  Aunt with colon cancer  Stress, family  Low grade nausea in mornings before court; amytriptline  Referral Dr. Crespo    Anxiety  Joined dancing group  Had done therapy in the past  Referral psychology  Dr. Crespo    Lactation  1 year  No blood  With nipple stimulation, bilat? Pt unsure.    Prolactin normal 5/28/2021    Family history of mental illness  3 family members with suicide    Medical History  Past Medical History:   Diagnosis Date    IUD contraception     Mirena       Review of Systems   Constitutional:  Positive for fatigue. Negative for fever.   HENT:  Negative for trouble swallowing.    Respiratory:  Negative for shortness of breath.    Cardiovascular:  Negative for chest pain.   Gastrointestinal:  Negative for constipation, diarrhea, nausea and vomiting.   Musculoskeletal:  Negative for gait problem.   Neurological:  Negative for dizziness and seizures.   Psychiatric/Behavioral:  Negative for hallucinations.        Surgical hx, family hx, social hx   Have been reviewed      Current Outpatient Medications:     amitriptyline (ELAVIL) 10 MG tablet, Take 1 tablet (10 mg total) by mouth every evening., Disp: 30 tablet, Rfl: 3    clindamycin (CLEOCIN T) 1 % external solution, AAA bid, Disp: 60 mL, Rfl: 3    diazePAM (VALIUM) 2 MG tablet, Take 1 tablet (2 mg total) by mouth On call Procedure for Anxiety. Take after signing consents for IUD  placement (Patient not taking: No sig reported), Disp: 1 tablet, Rfl: 0    levonorgestrel (MIRENA IU), , Disp: , Rfl:     triamcinolone acetonide 0.5% (KENALOG) 0.5 % Crea, Apply topically 2 (two) times daily., Disp: 15 g, Rfl: 5    Current Facility-Administered Medications:     levonorgestreL (MIRENA) 20 mcg/24 hours (6 yrs) 52 mg IUD 1 Intra Uterine Device, 1 Intra Uterine Device, Intrauterine, , Aaliyah Rosenberg MD, 1 Intra Uterine Device at 08/13/21 1115    Objective:        There is no height or weight on file to calculate BMI.  There were no vitals filed for this visit.    Physical Exam  Vitals and nursing note reviewed.   Constitutional:       General: She is not in acute distress.     Appearance: Normal appearance. She is not ill-appearing.   HENT:      Head: Normocephalic and atraumatic.   Eyes:      General: No scleral icterus.  Cardiovascular:      Rate and Rhythm: Normal rate and regular rhythm.      Heart sounds: Normal heart sounds.   Pulmonary:      Effort: Pulmonary effort is normal.   Abdominal:      General: There is no distension.   Musculoskeletal:         General: No deformity.      Cervical back: Normal range of motion.   Skin:     General: Skin is warm and dry.   Neurological:      Mental Status: She is alert and oriented to person, place, and time.   Psychiatric:         Behavior: Behavior normal.         Lab Results   Component Value Date    WBC 7.38 05/03/2022    HGB 14.6 05/03/2022    HCT 43.5 05/03/2022     05/03/2022    CHOL 160 05/03/2022    TRIG 74 05/03/2022    HDL 50 05/03/2022    ALT 8 (L) 05/03/2022    AST 13 05/03/2022     05/03/2022    K 3.7 05/03/2022     05/03/2022    CREATININE 1.1 05/03/2022    BUN 10 05/03/2022    CO2 27 05/03/2022    TSH 0.641 05/03/2022    HGBA1C 5.0 05/03/2022       The ASCVD Risk score (Ganga DK, et al., 2019) failed to calculate for the following reasons:    The 2019 ASCVD risk score is only valid for ages 40 to 79    (Imaging  have been independently reviewed)    Assessment:         No diagnosis found.        Plan:     There are no diagnoses linked to this encounter.      Health Maintenance  - Lipids: normal 5/2021  - A1C: normal 5/2021  - Colon Ca Screen: na  - Immunizations: received vaccine    Women's health  - Pap:  NILM normal 5/31/2021  - Mammo: na  - Dexa: na  - Contraception: IUD  - No concern for STDs    No follow-ups on file. or sooner prn      Check labs    All medications were reviewed including potential side effects and risks/benefits.  Pt was counseled to call back if anything worsens or if questions arise.    Guanako Ascencio MD  Family Medicine  [facility]  [address] [address]  Phone [phone]  Fax [phone]

## 2023-02-14 ENCOUNTER — TELEPHONE (OUTPATIENT)
Dept: FAMILY MEDICINE | Facility: CLINIC | Age: 33
End: 2023-02-14
Payer: COMMERCIAL

## 2023-02-14 ENCOUNTER — OFFICE VISIT (OUTPATIENT)
Dept: INTERNAL MEDICINE | Facility: CLINIC | Age: 33
End: 2023-02-14
Payer: COMMERCIAL

## 2023-02-14 ENCOUNTER — LAB VISIT (OUTPATIENT)
Dept: LAB | Facility: OTHER | Age: 33
End: 2023-02-14
Attending: STUDENT IN AN ORGANIZED HEALTH CARE EDUCATION/TRAINING PROGRAM
Payer: COMMERCIAL

## 2023-02-14 VITALS — BODY MASS INDEX: 29.53 KG/M2 | WEIGHT: 160.5 LBS | HEART RATE: 85 BPM | HEIGHT: 62 IN | OXYGEN SATURATION: 100 %

## 2023-02-14 DIAGNOSIS — Z91.89 HAS POORLY BALANCED DIET: ICD-10-CM

## 2023-02-14 DIAGNOSIS — R21 RASH: ICD-10-CM

## 2023-02-14 DIAGNOSIS — T78.40XA ALLERGY, INITIAL ENCOUNTER: ICD-10-CM

## 2023-02-14 DIAGNOSIS — Z00.00 PREVENTATIVE HEALTH CARE: ICD-10-CM

## 2023-02-14 DIAGNOSIS — Z00.00 ANNUAL PHYSICAL EXAM: ICD-10-CM

## 2023-02-14 DIAGNOSIS — Z00.00 ANNUAL PHYSICAL EXAM: Primary | ICD-10-CM

## 2023-02-14 LAB
ALBUMIN SERPL BCP-MCNC: 4.2 G/DL (ref 3.5–5.2)
ALP SERPL-CCNC: 73 U/L (ref 55–135)
ALT SERPL W/O P-5'-P-CCNC: 17 U/L (ref 10–44)
ANION GAP SERPL CALC-SCNC: 8 MMOL/L (ref 8–16)
AST SERPL-CCNC: 14 U/L (ref 10–40)
BASOPHILS # BLD AUTO: 0.05 K/UL (ref 0–0.2)
BASOPHILS NFR BLD: 0.6 % (ref 0–1.9)
BILIRUB SERPL-MCNC: 0.5 MG/DL (ref 0.1–1)
BUN SERPL-MCNC: 9 MG/DL (ref 6–20)
CALCIUM SERPL-MCNC: 9.6 MG/DL (ref 8.7–10.5)
CHLORIDE SERPL-SCNC: 105 MMOL/L (ref 95–110)
CHOLEST SERPL-MCNC: 163 MG/DL (ref 120–199)
CHOLEST/HDLC SERPL: 2.8 {RATIO} (ref 2–5)
CO2 SERPL-SCNC: 29 MMOL/L (ref 23–29)
CREAT SERPL-MCNC: 0.8 MG/DL (ref 0.5–1.4)
DIFFERENTIAL METHOD: NORMAL
EOSINOPHIL # BLD AUTO: 0.1 K/UL (ref 0–0.5)
EOSINOPHIL NFR BLD: 1.1 % (ref 0–8)
ERYTHROCYTE [DISTWIDTH] IN BLOOD BY AUTOMATED COUNT: 11.9 % (ref 11.5–14.5)
EST. GFR  (NO RACE VARIABLE): >60 ML/MIN/1.73 M^2
ESTIMATED AVG GLUCOSE: 97 MG/DL (ref 68–131)
FERRITIN SERPL-MCNC: 55 NG/ML (ref 20–300)
GLUCOSE SERPL-MCNC: 37 MG/DL (ref 70–110)
HBA1C MFR BLD: 5 % (ref 4–5.6)
HCT VFR BLD AUTO: 42.4 % (ref 37–48.5)
HDLC SERPL-MCNC: 58 MG/DL (ref 40–75)
HDLC SERPL: 35.6 % (ref 20–50)
HGB BLD-MCNC: 13.9 G/DL (ref 12–16)
IMM GRANULOCYTES # BLD AUTO: 0.03 K/UL (ref 0–0.04)
IMM GRANULOCYTES NFR BLD AUTO: 0.3 % (ref 0–0.5)
IRON SERPL-MCNC: 90 UG/DL (ref 30–160)
LDLC SERPL CALC-MCNC: 84.8 MG/DL (ref 63–159)
LYMPHOCYTES # BLD AUTO: 1.7 K/UL (ref 1–4.8)
LYMPHOCYTES NFR BLD: 18.3 % (ref 18–48)
MCH RBC QN AUTO: 30.2 PG (ref 27–31)
MCHC RBC AUTO-ENTMCNC: 32.8 G/DL (ref 32–36)
MCV RBC AUTO: 92 FL (ref 82–98)
MONOCYTES # BLD AUTO: 0.7 K/UL (ref 0.3–1)
MONOCYTES NFR BLD: 7.6 % (ref 4–15)
NEUTROPHILS # BLD AUTO: 6.5 K/UL (ref 1.8–7.7)
NEUTROPHILS NFR BLD: 72.1 % (ref 38–73)
NONHDLC SERPL-MCNC: 105 MG/DL
NRBC BLD-RTO: 0 /100 WBC
PLATELET # BLD AUTO: 311 K/UL (ref 150–450)
PMV BLD AUTO: 10.1 FL (ref 9.2–12.9)
POTASSIUM SERPL-SCNC: 3.7 MMOL/L (ref 3.5–5.1)
PROT SERPL-MCNC: 7.1 G/DL (ref 6–8.4)
RBC # BLD AUTO: 4.6 M/UL (ref 4–5.4)
SATURATED IRON: 21 % (ref 20–50)
SODIUM SERPL-SCNC: 142 MMOL/L (ref 136–145)
TOTAL IRON BINDING CAPACITY: 432 UG/DL (ref 250–450)
TRANSFERRIN SERPL-MCNC: 292 MG/DL (ref 200–375)
TRIGL SERPL-MCNC: 101 MG/DL (ref 30–150)
TSH SERPL DL<=0.005 MIU/L-ACNC: 0.97 UIU/ML (ref 0.4–4)
WBC # BLD AUTO: 9.07 K/UL (ref 3.9–12.7)

## 2023-02-14 PROCEDURE — 83036 HEMOGLOBIN GLYCOSYLATED A1C: CPT | Performed by: STUDENT IN AN ORGANIZED HEALTH CARE EDUCATION/TRAINING PROGRAM

## 2023-02-14 PROCEDURE — 99999 PR PBB SHADOW E&M-EST. PATIENT-LVL III: ICD-10-PCS | Mod: PBBFAC,,, | Performed by: STUDENT IN AN ORGANIZED HEALTH CARE EDUCATION/TRAINING PROGRAM

## 2023-02-14 PROCEDURE — 82728 ASSAY OF FERRITIN: CPT | Performed by: STUDENT IN AN ORGANIZED HEALTH CARE EDUCATION/TRAINING PROGRAM

## 2023-02-14 PROCEDURE — 85025 COMPLETE CBC W/AUTO DIFF WBC: CPT | Performed by: STUDENT IN AN ORGANIZED HEALTH CARE EDUCATION/TRAINING PROGRAM

## 2023-02-14 PROCEDURE — 3008F BODY MASS INDEX DOCD: CPT | Mod: CPTII,S$GLB,, | Performed by: STUDENT IN AN ORGANIZED HEALTH CARE EDUCATION/TRAINING PROGRAM

## 2023-02-14 PROCEDURE — 80061 LIPID PANEL: CPT | Performed by: STUDENT IN AN ORGANIZED HEALTH CARE EDUCATION/TRAINING PROGRAM

## 2023-02-14 PROCEDURE — 1159F PR MEDICATION LIST DOCUMENTED IN MEDICAL RECORD: ICD-10-PCS | Mod: CPTII,S$GLB,, | Performed by: STUDENT IN AN ORGANIZED HEALTH CARE EDUCATION/TRAINING PROGRAM

## 2023-02-14 PROCEDURE — 99999 PR PBB SHADOW E&M-EST. PATIENT-LVL III: CPT | Mod: PBBFAC,,, | Performed by: STUDENT IN AN ORGANIZED HEALTH CARE EDUCATION/TRAINING PROGRAM

## 2023-02-14 PROCEDURE — 3008F PR BODY MASS INDEX (BMI) DOCUMENTED: ICD-10-PCS | Mod: CPTII,S$GLB,, | Performed by: STUDENT IN AN ORGANIZED HEALTH CARE EDUCATION/TRAINING PROGRAM

## 2023-02-14 PROCEDURE — 99395 PREV VISIT EST AGE 18-39: CPT | Mod: S$GLB,,, | Performed by: STUDENT IN AN ORGANIZED HEALTH CARE EDUCATION/TRAINING PROGRAM

## 2023-02-14 PROCEDURE — 99395 PR PREVENTIVE VISIT,EST,18-39: ICD-10-PCS | Mod: S$GLB,,, | Performed by: STUDENT IN AN ORGANIZED HEALTH CARE EDUCATION/TRAINING PROGRAM

## 2023-02-14 PROCEDURE — 36415 COLL VENOUS BLD VENIPUNCTURE: CPT | Performed by: STUDENT IN AN ORGANIZED HEALTH CARE EDUCATION/TRAINING PROGRAM

## 2023-02-14 PROCEDURE — 1159F MED LIST DOCD IN RCRD: CPT | Mod: CPTII,S$GLB,, | Performed by: STUDENT IN AN ORGANIZED HEALTH CARE EDUCATION/TRAINING PROGRAM

## 2023-02-14 PROCEDURE — 84443 ASSAY THYROID STIM HORMONE: CPT | Performed by: STUDENT IN AN ORGANIZED HEALTH CARE EDUCATION/TRAINING PROGRAM

## 2023-02-14 PROCEDURE — 80053 COMPREHEN METABOLIC PANEL: CPT | Performed by: STUDENT IN AN ORGANIZED HEALTH CARE EDUCATION/TRAINING PROGRAM

## 2023-02-14 PROCEDURE — 84466 ASSAY OF TRANSFERRIN: CPT | Performed by: STUDENT IN AN ORGANIZED HEALTH CARE EDUCATION/TRAINING PROGRAM

## 2023-02-14 NOTE — PROGRESS NOTES
[facility]    Subjective:       Patient ID: Bladimir Bernal is a 32 y.o. female.    Chief Complaint: Annual Exam      History was obtained from the patient and supplemented through chart review.  This patient is known to me.     HPI:    Patient is a 32 y.o. female who presents to the clinic for her annual health maintenance visit and exam.     New rashes that come and go. Most recent was R elbow and upper R clavicle area, itchy, scaly and last for 2 months. Still residual. Has tried triamcinolone and has access to oral steroid per telehealth visit but she hasn't taken it.     Allergies to cat? Hasn't tried OTC antihistamines. Pt requesting allergy testing. Vacuuming, cat sleeps with her. Doesn't bother her too much.   Allergy referral    Any changes to medical history since you were here last? No    Health Maintenance     Recommended well woman exam.   Last pap May 2021. Due May 2024. Should get annual exam.     UTD vaccinations - flu and covid  Had covid in September. Pretty mild this time around.     Mental health - pt seems to be doing better than at her last visit.   Pt is a . Pt says she is doing better at managing stress with her job.   Pt reports she is not interested in therapy here, she is thinking of moving to [facility] to be closer to her partner probably in the summer.   Still taking amitriptyline? No. She has never taken it.     She had a recent visit to travel doc - Trip to [place]. Did end up scraping herself on metal but had received tetanus shot prior to travel. Had a great trip!    Lactation issue noted 5/22. Resolved a few years ago.     Eating habits/drinking  - Eats out a lot, tries to eat veggies and fruits.   - Multivitamin, ecnouraged  - Drinks socially: 3-4 drinks per week    Lipid panel and A1c looked good May 2022  Lab Results   Component Value Date    CHOL 160 05/03/2022    CHOL 166 05/28/2021     Lab Results   Component Value Date    HDL 50 05/03/2022     HDL 75 05/28/2021     Lab Results   Component Value Date    LDLCALC 95.2 05/03/2022    LDLCALC 82.0 05/28/2021     Lab Results   Component Value Date    TRIG 74 05/03/2022    TRIG 45 05/28/2021     Lab Results   Component Value Date    CHOLHDL 31.3 05/03/2022    CHOLHDL 45.2 05/28/2021      Lab Results   Component Value Date    HGBA1C 5.0 05/03/2022        Exercise  Doesn't have a regular routine. Does some walking and sometimes yoga.       Medical History  Past Medical History:   Diagnosis Date    IUD contraception     Mirena       Review of Systems   Constitutional:  Negative for chills, fatigue and fever.   HENT:  Negative for congestion, rhinorrhea and sore throat.    Eyes:  Negative for visual disturbance.   Respiratory:  Negative for cough, chest tightness, shortness of breath and wheezing.    Cardiovascular:  Negative for chest pain.   Gastrointestinal:  Negative for abdominal pain, diarrhea and vomiting.   Genitourinary:  Negative for difficulty urinating, dysuria and urgency.   Musculoskeletal:  Negative for arthralgias and myalgias.   Neurological:  Negative for dizziness, light-headedness, numbness and headaches.       Surgical hx, family hx, social hx   Have been reviewed.       Current Outpatient Medications:     levonorgestrel (MIRENA IU), , Disp: , Rfl:     triamcinolone acetonide 0.5% (KENALOG) 0.5 % Crea, Apply topically 2 (two) times daily., Disp: 15 g, Rfl: 5    amitriptyline (ELAVIL) 10 MG tablet, Take 1 tablet (10 mg total) by mouth every evening. (Patient not taking: Reported on 2/14/2023), Disp: 30 tablet, Rfl: 3    clindamycin (CLEOCIN T) 1 % external solution, AAA bid (Patient not taking: Reported on 2/14/2023), Disp: 60 mL, Rfl: 3    Current Facility-Administered Medications:     levonorgestreL (MIRENA) 20 mcg/24 hours (6 yrs) 52 mg IUD 1 Intra Uterine Device, 1 Intra Uterine Device, Intrauterine, , Aaliyah Rosenberg MD, 1 Intra Uterine Device at 08/13/21 1115    Objective:       "  Body mass index is 29.35 kg/m².  Vitals:    02/14/23 1538   Pulse: 85   SpO2: 100%   Weight: 72.8 kg (160 lb 7.9 oz)   Height: 5' 2" (1.575 m)   PainSc: 0-No pain     Physical Exam  Constitutional:       General: She is not in acute distress.     Appearance: Normal appearance.   HENT:      Head: Normocephalic and atraumatic.      Right Ear: Tympanic membrane normal.      Left Ear: Tympanic membrane normal.      Ears:      Comments: R ear canal erythematous      Nose: No congestion or rhinorrhea.      Mouth/Throat:      Mouth: Mucous membranes are moist.      Pharynx: Oropharynx is clear. No posterior oropharyngeal erythema.   Cardiovascular:      Rate and Rhythm: Normal rate and regular rhythm.      Heart sounds: No murmur heard.  Pulmonary:      Effort: No respiratory distress.      Breath sounds: Normal breath sounds. No wheezing or rhonchi.   Abdominal:      General: Abdomen is flat. Bowel sounds are normal.      Tenderness: There is no abdominal tenderness.   Musculoskeletal:         General: No swelling or deformity.   Skin:     General: Skin is warm and dry.      Findings: Rash (Minimal scaling on skin of R clavicle) present.   Neurological:      General: No focal deficit present.      Mental Status: She is alert.         Lab Results   Component Value Date    WBC 7.38 05/03/2022    HGB 14.6 05/03/2022    HCT 43.5 05/03/2022     05/03/2022    CHOL 160 05/03/2022    TRIG 74 05/03/2022    HDL 50 05/03/2022    ALT 8 (L) 05/03/2022    AST 13 05/03/2022     05/03/2022    K 3.7 05/03/2022     05/03/2022    CREATININE 1.1 05/03/2022    BUN 10 05/03/2022    CO2 27 05/03/2022    TSH 0.641 05/03/2022    HGBA1C 5.0 05/03/2022       The ASCVD Risk score (Ganga DK, et al., 2019) failed to calculate for the following reasons:    The 2019 ASCVD risk score is only valid for ages 40 to 79    (Imaging have been independently reviewed)    Assessment:         1. Annual physical exam    2. Preventative health " care    3. Rash    4. Allergy, initial encounter    5. Has poorly balanced diet          Plan:     Bladimir was seen today for annual exam.    Diagnoses and all orders for this visit:    Annual physical exam  -     Comprehensive Metabolic Panel; Future  -     Lipid Panel; Future  -     TSH; Future  -     CBC Auto Differential; Future  -     Hemoglobin A1C; Future    Preventative health care    Rash    Allergy, initial encounter  -     Ambulatory referral/consult to Allergy; Future    Has poorly balanced diet  -     FERRITIN; Future  -     IRON AND TIBC; Future    Health Maintenance  - Lipids: ordered  - A1C: ordered  - Colon Ca Screen: n/a  - Immunizations: N/a    Women's health  - Pap: Due May 2024  - Mammo: n/a  - Dexa: n/a  - Contraception: Mirena IUD        All of your core healthy metrics are met.      Follow up in about 1 year (around 2/14/2024). or sooner prn        All medications were reviewed including potential side effects and risks/benefits.  Pt was counseled to call back if anything worsens or if questions arise.        Note assistance with Yodit Linn MS4  Edited and signed by Guanako Ascencio MD  Family Medicine  [facility]  [address] [place] [id number]  Phone [phone]  Fax [phone]

## 2023-02-15 ENCOUNTER — PATIENT MESSAGE (OUTPATIENT)
Dept: INTERNAL MEDICINE | Facility: CLINIC | Age: 33
End: 2023-02-15
Payer: COMMERCIAL

## 2023-02-15 ENCOUNTER — LAB VISIT (OUTPATIENT)
Dept: LAB | Facility: OTHER | Age: 33
End: 2023-02-15
Attending: STUDENT IN AN ORGANIZED HEALTH CARE EDUCATION/TRAINING PROGRAM
Payer: COMMERCIAL

## 2023-02-15 DIAGNOSIS — E16.2 HYPOGLYCEMIA: ICD-10-CM

## 2023-02-15 DIAGNOSIS — E16.2 HYPOGLYCEMIA: Primary | ICD-10-CM

## 2023-02-15 LAB
ANION GAP SERPL CALC-SCNC: 10 MMOL/L (ref 8–16)
BUN SERPL-MCNC: 8 MG/DL (ref 6–20)
CALCIUM SERPL-MCNC: 9.5 MG/DL (ref 8.7–10.5)
CHLORIDE SERPL-SCNC: 105 MMOL/L (ref 95–110)
CO2 SERPL-SCNC: 25 MMOL/L (ref 23–29)
CREAT SERPL-MCNC: 0.8 MG/DL (ref 0.5–1.4)
EST. GFR  (NO RACE VARIABLE): >60 ML/MIN/1.73 M^2
GLUCOSE SERPL-MCNC: 109 MG/DL (ref 70–110)
POTASSIUM SERPL-SCNC: 3.7 MMOL/L (ref 3.5–5.1)
SODIUM SERPL-SCNC: 140 MMOL/L (ref 136–145)

## 2023-02-15 PROCEDURE — 80048 BASIC METABOLIC PNL TOTAL CA: CPT | Performed by: STUDENT IN AN ORGANIZED HEALTH CARE EDUCATION/TRAINING PROGRAM

## 2023-02-15 PROCEDURE — 36415 COLL VENOUS BLD VENIPUNCTURE: CPT | Performed by: STUDENT IN AN ORGANIZED HEALTH CARE EDUCATION/TRAINING PROGRAM

## 2023-03-14 ENCOUNTER — LAB VISIT (OUTPATIENT)
Dept: LAB | Facility: HOSPITAL | Age: 33
End: 2023-03-14
Payer: COMMERCIAL

## 2023-03-14 ENCOUNTER — OFFICE VISIT (OUTPATIENT)
Dept: ALLERGY | Facility: CLINIC | Age: 33
End: 2023-03-14
Payer: COMMERCIAL

## 2023-03-14 VITALS — HEIGHT: 63 IN | BODY MASS INDEX: 28.56 KG/M2 | WEIGHT: 161.19 LBS

## 2023-03-14 DIAGNOSIS — T78.40XA ALLERGY, INITIAL ENCOUNTER: ICD-10-CM

## 2023-03-14 DIAGNOSIS — J31.0 CHRONIC RHINITIS: Primary | ICD-10-CM

## 2023-03-14 DIAGNOSIS — J31.0 CHRONIC RHINITIS: ICD-10-CM

## 2023-03-14 DIAGNOSIS — R06.00 DYSPNEA, UNSPECIFIED TYPE: ICD-10-CM

## 2023-03-14 DIAGNOSIS — Z91.018 FOOD ALLERGY: ICD-10-CM

## 2023-03-14 DIAGNOSIS — H10.403 CHRONIC CONJUNCTIVITIS OF BOTH EYES, UNSPECIFIED CHRONIC CONJUNCTIVITIS TYPE: ICD-10-CM

## 2023-03-14 PROCEDURE — 1159F PR MEDICATION LIST DOCUMENTED IN MEDICAL RECORD: ICD-10-PCS | Mod: CPTII,S$GLB,, | Performed by: ALLERGY & IMMUNOLOGY

## 2023-03-14 PROCEDURE — 3044F PR MOST RECENT HEMOGLOBIN A1C LEVEL <7.0%: ICD-10-PCS | Mod: CPTII,S$GLB,, | Performed by: ALLERGY & IMMUNOLOGY

## 2023-03-14 PROCEDURE — 99999 PR PBB SHADOW E&M-EST. PATIENT-LVL III: ICD-10-PCS | Mod: PBBFAC,,, | Performed by: ALLERGY & IMMUNOLOGY

## 2023-03-14 PROCEDURE — 3044F HG A1C LEVEL LT 7.0%: CPT | Mod: CPTII,S$GLB,, | Performed by: ALLERGY & IMMUNOLOGY

## 2023-03-14 PROCEDURE — 3008F PR BODY MASS INDEX (BMI) DOCUMENTED: ICD-10-PCS | Mod: CPTII,S$GLB,, | Performed by: ALLERGY & IMMUNOLOGY

## 2023-03-14 PROCEDURE — 99204 PR OFFICE/OUTPT VISIT, NEW, LEVL IV, 45-59 MIN: ICD-10-PCS | Mod: S$GLB,,, | Performed by: ALLERGY & IMMUNOLOGY

## 2023-03-14 PROCEDURE — 36415 COLL VENOUS BLD VENIPUNCTURE: CPT | Performed by: ALLERGY & IMMUNOLOGY

## 2023-03-14 PROCEDURE — 1160F PR REVIEW ALL MEDS BY PRESCRIBER/CLIN PHARMACIST DOCUMENTED: ICD-10-PCS | Mod: CPTII,S$GLB,, | Performed by: ALLERGY & IMMUNOLOGY

## 2023-03-14 PROCEDURE — 3008F BODY MASS INDEX DOCD: CPT | Mod: CPTII,S$GLB,, | Performed by: ALLERGY & IMMUNOLOGY

## 2023-03-14 PROCEDURE — 86003 ALLG SPEC IGE CRUDE XTRC EA: CPT | Performed by: ALLERGY & IMMUNOLOGY

## 2023-03-14 PROCEDURE — 1160F RVW MEDS BY RX/DR IN RCRD: CPT | Mod: CPTII,S$GLB,, | Performed by: ALLERGY & IMMUNOLOGY

## 2023-03-14 PROCEDURE — 99999 PR PBB SHADOW E&M-EST. PATIENT-LVL III: CPT | Mod: PBBFAC,,, | Performed by: ALLERGY & IMMUNOLOGY

## 2023-03-14 PROCEDURE — 1159F MED LIST DOCD IN RCRD: CPT | Mod: CPTII,S$GLB,, | Performed by: ALLERGY & IMMUNOLOGY

## 2023-03-14 PROCEDURE — 99204 OFFICE O/P NEW MOD 45 MIN: CPT | Mod: S$GLB,,, | Performed by: ALLERGY & IMMUNOLOGY

## 2023-03-14 PROCEDURE — 86003 ALLG SPEC IGE CRUDE XTRC EA: CPT | Mod: 59 | Performed by: ALLERGY & IMMUNOLOGY
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
## 2023-03-17 LAB
A ALTERNATA IGE QN: <0.1 KU/L
A FUMIGATUS IGE QN: <0.1 KU/L
BERMUDA GRASS IGE QN: 0.13 KU/L
CAT DANDER IGE QN: 1.35 KU/L
CEDAR IGE QN: <0.1 KU/L
D FARINAE IGE QN: <0.1 KU/L
D PTERONYSS IGE QN: <0.1 KU/L
DEPRECATED A ALTERNATA IGE RAST QL: NORMAL
DEPRECATED A FUMIGATUS IGE RAST QL: NORMAL
DEPRECATED BERMUDA GRASS IGE RAST QL: ABNORMAL
DEPRECATED CAT DANDER IGE RAST QL: ABNORMAL
DEPRECATED CEDAR IGE RAST QL: NORMAL
DEPRECATED D FARINAE IGE RAST QL: NORMAL
DEPRECATED D PTERONYSS IGE RAST QL: NORMAL
DEPRECATED DOG DANDER IGE RAST QL: NORMAL
DEPRECATED ELDER IGE RAST QL: NORMAL
DEPRECATED ENGL PLANTAIN IGE RAST QL: NORMAL
DEPRECATED MUSHROOM IGE RAST QL: NORMAL
DEPRECATED PECAN/HICK TREE IGE RAST QL: ABNORMAL
DEPRECATED ROACH IGE RAST QL: NORMAL
DEPRECATED TIMOTHY IGE RAST QL: NORMAL
DEPRECATED WEST RAGWEED IGE RAST QL: NORMAL
DEPRECATED WHITE OAK IGE RAST QL: ABNORMAL
DOG DANDER IGE QN: <0.1 KU/L
ELDER IGE QN: <0.1 KU/L
ENGL PLANTAIN IGE QN: <0.1 KU/L
MUSHROOM IGE QN: <0.1 KU/L
PECAN/HICK TREE IGE QN: 0.11 KU/L
ROACH IGE QN: <0.1 KU/L
TIMOTHY IGE QN: <0.1 KU/L
WEST RAGWEED IGE QN: <0.1 KU/L
WHITE OAK IGE QN: 0.22 KU/L

## 2023-03-22 ENCOUNTER — PROCEDURE VISIT (OUTPATIENT)
Dept: ALLERGY | Facility: CLINIC | Age: 33
End: 2023-03-22
Payer: COMMERCIAL

## 2023-03-22 VITALS
OXYGEN SATURATION: 98 % | HEIGHT: 63 IN | SYSTOLIC BLOOD PRESSURE: 115 MMHG | DIASTOLIC BLOOD PRESSURE: 52 MMHG | BODY MASS INDEX: 28.59 KG/M2 | HEART RATE: 92 BPM | WEIGHT: 161.38 LBS

## 2023-03-22 DIAGNOSIS — H10.13 ALLERGIC CONJUNCTIVITIS, BILATERAL: ICD-10-CM

## 2023-03-22 DIAGNOSIS — J30.81 ALLERGIC RHINITIS DUE TO ANIMAL HAIR AND DANDER: Primary | ICD-10-CM

## 2023-03-22 PROCEDURE — 99214 OFFICE O/P EST MOD 30 MIN: CPT | Mod: S$GLB,,, | Performed by: ALLERGY & IMMUNOLOGY

## 2023-03-22 PROCEDURE — 99214 PR OFFICE/OUTPT VISIT, EST, LEVL IV, 30-39 MIN: ICD-10-PCS | Mod: S$GLB,,, | Performed by: ALLERGY & IMMUNOLOGY

## 2023-03-22 RX ORDER — FLUTICASONE PROPIONATE 50 MCG
2 SPRAY, SUSPENSION (ML) NASAL DAILY
Qty: 16 G | Refills: 11 | Status: SHIPPED | OUTPATIENT
Start: 2023-03-22 | End: 2023-04-21

## 2023-03-22 RX ORDER — AZELASTINE 1 MG/ML
1-2 SPRAY, METERED NASAL 2 TIMES DAILY PRN
Qty: 30 ML | Refills: 5 | Status: SHIPPED | OUTPATIENT
Start: 2023-03-22
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
